# Patient Record
Sex: FEMALE | Race: WHITE | NOT HISPANIC OR LATINO | Employment: FULL TIME | URBAN - METROPOLITAN AREA
[De-identification: names, ages, dates, MRNs, and addresses within clinical notes are randomized per-mention and may not be internally consistent; named-entity substitution may affect disease eponyms.]

---

## 2018-01-22 ENCOUNTER — ALLSCRIPTS OFFICE VISIT (OUTPATIENT)
Dept: OTHER | Facility: OTHER | Age: 43
End: 2018-01-22

## 2018-01-22 DIAGNOSIS — E27.1 PRIMARY ADRENOCORTICAL INSUFFICIENCY (HCC): ICD-10-CM

## 2018-01-22 DIAGNOSIS — E03.9 HYPOTHYROIDISM: ICD-10-CM

## 2018-01-22 DIAGNOSIS — E55.9 VITAMIN D DEFICIENCY: ICD-10-CM

## 2018-01-23 NOTE — CONSULTS
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)   2  Vitamin D deficiency (268 9) (E55 9)   3  Hypothyroidism (244 9) (E03 9)    Plan   Gladstone's disease    · Solu-CORTEF 100 MG Injection Solution Reconstituted; Use IM in the event of    emergency   Rx By: Everardo Nielsen; Dispense: 0 Days ; #:1 X 1 Solution Reconstituted Vial; Refill: 0;For: Gladstone's disease; MARIANNA = N; Faxed To: [facility] #[id number]  · From  Hydrocortisone 10 MG Oral Tablet take 2 in the morning    take 1 in the afternoon To Hydrocortisone 10 MG Oral Tablet take 2 in the morning    take 1 in the afternoon    Take extra as directed in case of adrenal crisis   Rx By: Everardo Nielsen; Dispense: 0 Days ; #:120 Tablet; Refill: 0;For: Gladstone's disease; MARIANNA = N; Faxed To: [facility] #[id number] Jimi's disease, Hypothyroidism    · (1) PANCREATIC ISLET CELL ANTIBODIES; Status:Active; Requested for:22Jan2018; Perform:[facility]; ZZA:[id number];KNDNFYE; For:Gladstone's disease, Hypothyroidism; Ordered By:Tony Dominguez; Hypothyroidism    · Syringe 23G X 1 3 ML Miscellaneous; USE AS DIRECTED in case of adrenal crisis   Rx By: Everardo Nielsen; Dispense: 0 Days ; #:1 Miscellaneous; Refill: 0;For: Hypothyroidism; MARIANNA = N; Faxed To: [id number]  · (1) CBC/ PLT (NO DIFF); Status:Active; Requested for:22Jan2018; Perform:[facility]; FQM:[id number];XHQSLUE;KIJ:POOWJRTZCHFRYM; Ordered By:Tony Dominguez;   · (1) COMPREHENSIVE METABOLIC PANEL; Status:Active; Requested for:22Jan2018; Perform:[facility]; NJJ:[id number];SOEHLZK;GWW:BVXFZPCWPTPOWJ; Ordered By:Tony Dominguez;   · (1) HEMOGLOBIN A1C; Status:Active; Requested for:22Jan2018; Perform:[facility]; UJA:[id number];BRPJBOP;TALA:GBWJULHARVRJIA; Ordered By:Tony Dominguez;   · (1) T4, FREE; Status:Active; Requested for:22Jan2018;     Perform:[facility]; UOL:[id number];XJZRKGV;MJI:KXGLETOLUSTLGN; Ordered By:Tony Dominguez;   · (1) TSH; Status:Active; Requested for:22Jan2018; Perform:[facility]; RUF:[id number];MFYVSAK;VFO:MZHMATWBOJATPB; Ordered By:Anuja Dominguez; Hypothyroidism, Vitamin D deficiency    · (1) VITAMIN D 25-HYDROXY; Status:Active; Requested for:22Jan2018; Perform:[facility]; Due:22Jan2019;Ordered;For:Hypothyroidism, Vitamin D deficiency; Ordered By:Anuja Dominguez;  Vitamin D deficiency    · (1) CELIAC DISEASE AB PROFILE; Status:Active; Requested for:22Jan2018; Perform:[facility]; TVJ:[id number];CDZYGQN; For:Vitamin D deficiency; Ordered By:Anuja Dominguez;   · (1) METHYLMALONIC ACID,BLOOD; Status:Active; Requested for:22Jan2018; Perform:[facility]; FBR:[id number];SBVNIIE; For:Vitamin D deficiency; Ordered By:Miguel Angel Dominguez;   · (1) VITAMIN B12; Status:Active; Requested for:22Jan2018; Perform:[facility]; FTJ:[id number];LPBHHCU; For:Vitamin D deficiency; Ordered By:Miguel Angel Dominguez;   · (LC) LOLI-65 Autoantibody; Status:Active; Requested for:22Jan2018; Perform:[facility]; MVL:[id number];XPXJXVI; For:Vitamin D deficiency; Ordered By:Anuja Dominguez;   · Follow-up visit in 6 months Evaluation and Treatment  Follow-up  Status: Hold For -    Scheduling  Requested for: [id number]   Ordered; For: Vitamin D deficiency; Ordered By: Blaise Grewal Performed:  Due: [id number]    Discussion/Summary   Discussion Summary:    1  Jimi's disease: Continue hydrocortisone 20 mg in the morning and 10 mg in the afternoon  Discussed sick day rules such as doubling the dose for 2-3 days in setting of minor illness and mild fever and then return to baseline dose after that  Also discussed in terms of more moderate her severe illness such as high-grade fever above 100 4 or 101 to triple the dose for 3 days and return to baseline dose after that  Also discussed use of emergency Solu-Cortef injection kit in setting of adrenal crisis   Discussed that this does not replace going to the emergency room and if this is needed because she cannot take p o  in the setting of illness that she should use this and report to the emergency room  She continues to wear her emergency bracelet  Discussed if any surgical procedures are planned she should let me know  Continue fludrocortisone  Will check electrolyte panel  Hypothyroidism: Check TSH and free T4  Coffman syndrome: Will screen for other autoimmune diseases as ordered above  in 6 months  Counseling Documentation With Imm: The patient was counseled regarding diagnostic results,-- instructions for management,-- impressions  Medication SE Review and Pt Understands Tx: The treatment plan was reviewed with the patient/guardian  The patient/guardian understands and agrees with the treatment plan      Chief Complaint   Chief Complaint Free Text Note Form: consult      History of Present Illness   HPI: 74-year-old female with history of primary autoimmune hypothyroidism, Beauregard's disease, vitamin-D deficiency presents to [facility] care  He was diagnosed with hypothyroidism over 10 years ago  About 5 years ago, she was evaluated for hypotension, nausea, vomiting, weight loss and 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  Having both 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  Overall, she feels that she has been doing well lately  She did have a hospitalization in November of 2017 with a stomach bug and required intravenous hydrocortisone  Currently she takes hydrocortisone 20 mg in the morning and 10 mg in the afternoon  She takes fludrocortisone 0 1 mg daily  She has not had any other recent illnesses and does not have any surgeries planned other than dental crowns  She does have a emergency bracelet indicating she has adrenal insufficiency and is on chronic steroid replacement  She does report normal menstrual cycles   She denies any history of celiac, B12 deficiency, diabetes  She also has a history of hypothyroidism and takes Synthroid 125 mcg daily  She does not have a Hydrocortisone emergency injection kit  Review of Systems   Endo Adult ROS Female New Patient:      Constitutional/General: no change in ring size,-- no change in shoe size,-- no chills,-- no dizziness,-- no fainting,-- no fatigue,-- no fever,-- no forgetfulness,-- no headache,-- no loss of sleep,-- no recent weight loss,-- no nervousness,-- no numbness,-- no temperature intolerance,-- no excessive sweating-- and-- no weight gain  Muscle/Joint/Bone: no arm pain,-- no back pain,-- no hip pain,-- no leg pain,-- no foot pain,-- no neck pain,-- no hand pain,-- no shoulder pain,-- no arm weakness,-- no back weakness,-- no hip weakness,-- no leg weakness,-- no foot weakness,-- no neck weakness,-- no hand weakness,-- no shoulder weakness,-- no arm numbness,-- no back numbness,-- no hip numbness,-- no leg numbness,-- no foot numbness,-- no neck numbness,-- no hand numbness-- and-- no shoulder numbness  Gastrointestinal: no constipation,-- no diarrhea,-- no excessive hunger,-- no excessive thirst,-- no nausea,-- no poor appetite,-- no rectal bleeding,-- no stomach pain,-- no vomiting-- and-- no vomiting blood  Cardiovascular: no chest pain,-- no hypertension,-- no irregular heart beat,-- no hypotension,-- no poor circulation,-- no rapid heart beat-- and-- no ankle swelling  Eye/Ear/Nose/Throat: no bleeding gums,-- no blurred vision,-- no difficulty swallowing,-- no double vision,-- no gritty eyes,-- no hoarseness,-- no hearing loss,-- no persistent cough,-- no sinus problems,-- not seeing flashes-- and-- not seeing halos  Skin: no easy bruising,-- no hives,-- no itching,-- no change in a mole,-- no rashes,-- no scar-- and-- no slow healing sores  Genitourinary no blood in urine,-- no frequent urination,-- no night time urination-- and-- no painful urination        Genitourinary - Reproductive 3 Children, but-- normal period,-- no bleeding between periods,-- no breast lump,-- no hot flashes,-- no nipple discharge,-- no vaginal discharge-- and-- not pregnant  date of last menstruation 1/15/18 the interval of the LMP is unknown periods usually lasts an unspecified duration             ROS Reviewed:    ROS reviewed  Past Medical History   Active Problems And Past Medical History Reviewed: The active problems and past medical history were reviewed and updated today  Surgical History   Surgical History Reviewed: The surgical history was reviewed and updated today  Family History   Mother    1  Family history of cardiac disorder (V17 49) (Z82 49)  Father    2  Family history of cardiac disorder (V17 49) (Z82 49)  Family History Reviewed: The family history was reviewed and updated today  Social History    · Never a smoker  Social History Reviewed: The social history was reviewed and updated today  Current Meds    1  Fludrocortisone Acetate 0 1 MG Oral Tablet; 1 tablet daily; Therapy: (Cecy [place]in) to Recorded   2  Hydrocortisone 10 MG Oral Tablet; take 2 in the morning     take 1 in the afternoon; Therapy: (Cecy [place]) to Recorded   3  LORazepam 0 5 MG Oral Tablet; Therapy: (Cecy [place]) to Recorded   4  Synthroid 125 MCG Oral Tablet; Therapy: (Cecy [place]) to Recorded   5  Vitamin C TABS; Therapy: (Cecy Med[place]in) to Recorded   6  Vitamin D3 1000 UNIT Oral Capsule; Therapy: (Recorded:22Jan2018) to Recorded  Medication List Reviewed: The medication list was reviewed and updated today  Allergies   1   No Known Drug Allergies    Vitals   Vital Signs    Recorded: 75LVU2550 09:55AM   Heart Rate 72   Systolic 545   Diastolic 76   Height 5 ft 1 in   Weight 154 lb 8 oz   BMI Calculated 29 19   BSA Calculated 1 69     Physical Exam        Constitutional      General appearance: No acute distress, well appearing and well nourished  Eyes      Conjunctiva and lids: No swelling, erythema, or discharge  Pupils: Equal, round and reactive to light  The sclera are anicteric  Extraocular movements are intact  Ears, Nose, Mouth, and Throat      Neck: The neck is supple  The thyroid is normal in size with no palpable nodules  Pulmonary      Respiratory effort: No increased work of breathing or signs of respiratory distress  Auscultation of lungs: Clear to auscultation bilaterally with normal chest expansion  Cardiovascular      Auscultation of heart: Normal rate and rhythm with no murmurs, gallops or rubs  Examination of extremities for edema and/or varicosities: Normal        Abdomen      Abdomen: Abdomen is soft, non-tender with normal bowel sounds  Lymphatic      Palpation of lymph nodes: No supraclavicular or suboccipital lymphadenopathy  Skin      Skin and subcutaneous tissue: Normal skin temperature and color  Neurologic      Motor Strength: Strength is 5/5 bilaterally  Psychiatric      Orientation to person, place and time: Normal        Mood and affect: Affect and attention span are normal        Results/Data   Office Record Review: 11/22/2017: blood cells 16 1, hemoglobin 15 3, glucose 112, sodium 136, potassium 3 6    0 625, total T4 6 9        Signatures    Electronically signed by : RADHA Pemberton ; Jan 22 2018 10:45AM EST                       (Author)

## 2018-02-01 DIAGNOSIS — E27.1 ADDISON'S DISEASE (HCC): Primary | ICD-10-CM

## 2018-02-01 RX ORDER — HYDROCORTISONE 10 MG/1
TABLET ORAL
COMMUNITY
End: 2018-02-01 | Stop reason: SDUPTHER

## 2018-02-01 RX ORDER — BIOTIN 1 MG
TABLET ORAL
COMMUNITY

## 2018-02-01 RX ORDER — HYDROCORTISONE 10 MG/1
TABLET ORAL
Qty: 120 TABLET | Refills: 5 | Status: SHIPPED | OUTPATIENT
Start: 2018-02-01 | End: 2018-02-02 | Stop reason: SDUPTHER

## 2018-02-01 RX ORDER — RIBOFLAVIN (VITAMIN B2) 100 MG
TABLET ORAL
COMMUNITY

## 2018-02-01 RX ORDER — LORAZEPAM 0.5 MG/1
TABLET ORAL AS NEEDED
COMMUNITY

## 2018-02-01 RX ORDER — FLUDROCORTISONE ACETATE 0.1 MG/1
1 TABLET ORAL DAILY
COMMUNITY
End: 2018-04-09 | Stop reason: SDUPTHER

## 2018-02-01 RX ORDER — SYRINGE W-NEEDLE,DISPOSAB,3 ML 25GX5/8"
SYRINGE, EMPTY DISPOSABLE MISCELLANEOUS
COMMUNITY
Start: 2018-01-22

## 2018-02-01 RX ORDER — LEVOTHYROXINE SODIUM 0.12 MG/1
TABLET ORAL
COMMUNITY
End: 2018-04-09 | Stop reason: SDUPTHER

## 2018-02-01 NOTE — TELEPHONE ENCOUNTER
Spoke to patient, she needs the hydrocortisone to have specific dosing  The "adrenal crisis" extra does not work for the pharmacy  Please and thank you

## 2018-02-01 NOTE — TELEPHONE ENCOUNTER
Patient left a message in regards to one of the rx you prescribed  She stated in the message that her pharmacist need specific instructions because her insurance will not cover it the way it is written out for her   Please call patient at [phone]

## 2018-02-02 DIAGNOSIS — E27.1 ADDISON'S DISEASE (HCC): ICD-10-CM

## 2018-02-02 RX ORDER — HYDROCORTISONE 10 MG/1
TABLET ORAL
Qty: 120 TABLET | Refills: 0 | Status: SHIPPED | OUTPATIENT
Start: 2018-02-02 | End: 2018-02-02 | Stop reason: SDUPTHER

## 2018-02-02 RX ORDER — HYDROCORTISONE 10 MG/1
TABLET ORAL
Qty: 120 TABLET | Refills: 0 | Status: SHIPPED | OUTPATIENT
Start: 2018-02-02 | End: 2018-02-06 | Stop reason: SDUPTHER

## 2018-02-06 ENCOUNTER — TELEPHONE (OUTPATIENT)
Dept: ENDOCRINOLOGY | Facility: HOSPITAL | Age: 43
End: 2018-02-06

## 2018-02-06 DIAGNOSIS — E27.1 ADDISON'S DISEASE (HCC): ICD-10-CM

## 2018-02-06 RX ORDER — HYDROCORTISONE 10 MG/1
TABLET ORAL
Qty: 120 TABLET | Refills: 4 | Status: SHIPPED | OUTPATIENT
Start: 2018-02-06 | End: 2018-03-12 | Stop reason: SDUPTHER

## 2018-02-06 NOTE — TELEPHONE ENCOUNTER
Pt called in regards to her Hyrdocortisone 10mg that was sent on 2/2  She said she asked for enough refills to last her 6 months  She already picked up the script so if we send it to the pharmacy again they wont fill it for a month and I don't think that pharmacies hold the rx that long  She said who ever she spoke to last week told her the rx had refills on it  She does not want to have to call back next month and wants it fixed for when she is due again  Is there anything we can do?

## 2018-02-06 NOTE — TELEPHONE ENCOUNTER
I verbally called in this order yesterday, I did not authorize any refills per you order  I remember speaking to her previously and that order had refills  Can we send a new script with refills so she is set for the future? I can even call the pharmacy again just so I know it is processed  Previously you had sent it twice and they never received it  Thank you

## 2018-02-06 NOTE — TELEPHONE ENCOUNTER
Tried to send in updated script with refills to the pharmacy but it printed out  Can you fax it to the pharmacy? Not sure why it will not send over

## 2018-02-16 LAB
25(OH)D3+25(OH)D2 SERPL-MCNC: 34.5 NG/ML (ref 30–100)
ALBUMIN SERPL-MCNC: 4.7 G/DL (ref 3.5–5.5)
ALBUMIN/GLOB SERPL: 2 {RATIO} (ref 1.2–2.2)
ALP SERPL-CCNC: 38 IU/L (ref 39–117)
ALT SERPL-CCNC: 16 IU/L (ref 0–32)
AMBIG ABBREV DEFAULT: NORMAL
AST SERPL-CCNC: 18 IU/L (ref 0–40)
BILIRUB SERPL-MCNC: 0.5 MG/DL (ref 0–1.2)
BUN SERPL-MCNC: 16 MG/DL (ref 6–24)
BUN/CREAT SERPL: 19 (ref 9–23)
CALCIUM SERPL-MCNC: 9.6 MG/DL (ref 8.7–10.2)
CHLORIDE SERPL-SCNC: 96 MMOL/L (ref 96–106)
CO2 SERPL-SCNC: 23 MMOL/L (ref 18–29)
CREAT SERPL-MCNC: 0.86 MG/DL (ref 0.57–1)
ENDOMYSIUM IGA SER QL: NEGATIVE
ERYTHROCYTE [DISTWIDTH] IN BLOOD BY AUTOMATED COUNT: 13.4 % (ref 12.3–15.4)
GAD65 AB SER-ACNC: <5 U/ML (ref 0–5)
GLOBULIN SER-MCNC: 2.3 G/DL (ref 1.5–4.5)
GLUCOSE SERPL-MCNC: 81 MG/DL (ref 65–99)
HBA1C MFR BLD: 5 % (ref 4.8–5.6)
HCT VFR BLD AUTO: 44 % (ref 34–46.6)
HGB BLD-MCNC: 14.8 G/DL (ref 11.1–15.9)
IGA SERPL-MCNC: 277 MG/DL (ref 87–352)
MCH RBC QN AUTO: 32.1 PG (ref 26.6–33)
MCHC RBC AUTO-ENTMCNC: 33.6 G/DL (ref 31.5–35.7)
MCV RBC AUTO: 95 FL (ref 79–97)
METHYLMALONATE SERPL-SCNC: 113 NMOL/L (ref 0–378)
PANC ISLET CELL AB TITR SER: NEGATIVE {TITER}
PLATELET # BLD AUTO: 247 X10E3/UL (ref 150–379)
POTASSIUM SERPL-SCNC: 4 MMOL/L (ref 3.5–5.2)
PROT SERPL-MCNC: 7 G/DL (ref 6–8.5)
RBC # BLD AUTO: 4.61 X10E6/UL (ref 3.77–5.28)
SL AMB EGFR AFRICAN AMERICAN: 98 ML/MIN/1.73
SL AMB EGFR NON AFRICAN AMERICAN: 85 ML/MIN/1.73
SODIUM SERPL-SCNC: 135 MMOL/L (ref 134–144)
T4 FREE SERPL-MCNC: 1.84 NG/DL (ref 0.82–1.77)
TSH SERPL DL<=0.005 MIU/L-ACNC: 0.92 UIU/ML (ref 0.45–4.5)
TTG IGA SER-ACNC: <2 U/ML (ref 0–3)
VIT B12 SERPL-MCNC: 897 PG/ML (ref 232–1245)
WBC # BLD AUTO: 9.5 X10E3/UL (ref 3.4–10.8)

## 2018-03-12 ENCOUNTER — TELEPHONE (OUTPATIENT)
Dept: ENDOCRINOLOGY | Facility: HOSPITAL | Age: 43
End: 2018-03-12

## 2018-03-12 DIAGNOSIS — E27.1 ADDISON'S DISEASE (HCC): ICD-10-CM

## 2018-03-12 RX ORDER — HYDROCORTISONE 10 MG/1
TABLET ORAL
Qty: 120 TABLET | Refills: 4 | Status: SHIPPED | OUTPATIENT
Start: 2018-03-12 | End: 2018-07-24 | Stop reason: SDUPTHER

## 2018-03-12 NOTE — TELEPHONE ENCOUNTER
Pt needs a refill of hydrocoritsone tablet 10mg to [facility] in [place] [address]  She is completely out

## 2018-04-09 DIAGNOSIS — E06.3 HASHIMOTO'S THYROIDITIS: ICD-10-CM

## 2018-04-09 DIAGNOSIS — E27.1 ADRENAL INSUFFICIENCY (ADDISON'S DISEASE) (HCC): Primary | ICD-10-CM

## 2018-04-09 RX ORDER — LEVOTHYROXINE SODIUM 0.12 MG/1
125 TABLET ORAL DAILY
Qty: 30 TABLET | Refills: 3 | Status: SHIPPED | OUTPATIENT
Start: 2018-04-09 | End: 2018-07-24 | Stop reason: SDUPTHER

## 2018-04-09 RX ORDER — FLUDROCORTISONE ACETATE 0.1 MG/1
0.1 TABLET ORAL DAILY
Qty: 30 TABLET | Refills: 3 | Status: SHIPPED | OUTPATIENT
Start: 2018-04-09 | End: 2018-07-24 | Stop reason: SDUPTHER

## 2018-07-12 ENCOUNTER — TELEPHONE (OUTPATIENT)
Dept: ENDOCRINOLOGY | Facility: HOSPITAL | Age: 43
End: 2018-07-12

## 2018-07-12 DIAGNOSIS — E03.9 HYPOTHYROIDISM, UNSPECIFIED TYPE: Primary | ICD-10-CM

## 2018-07-12 NOTE — TELEPHONE ENCOUNTER
Pt called  Very concerned about too much meds  Has been having heart palpitations every day for over a week, can't function  Currently on Synthroid & two different steroids  Only difference may be that she changed her diet  Wants to get her levels checked ASAP  Please let her know

## 2018-07-12 NOTE — TELEPHONE ENCOUNTER
Pt has an appt with you on on 7/24  She had blood work done in February and those are in [place]  She wants to know if she needs any labs before her next appt?

## 2018-07-12 NOTE — TELEPHONE ENCOUNTER
Check TSH, free T4 and comprehensive metabolic panel    I will get in touch with her about the results prior to her office visit if need be to make changes in her medication regimen, if applicable

## 2018-07-14 LAB
ALBUMIN SERPL-MCNC: 4.5 G/DL (ref 3.5–5.5)
ALBUMIN/GLOB SERPL: 2 {RATIO} (ref 1.2–2.2)
ALP SERPL-CCNC: 32 IU/L (ref 39–117)
ALT SERPL-CCNC: 13 IU/L (ref 0–32)
AMBIG ABBREV DEFAULT: NORMAL
AST SERPL-CCNC: 14 IU/L (ref 0–40)
BILIRUB SERPL-MCNC: 0.7 MG/DL (ref 0–1.2)
BUN SERPL-MCNC: 12 MG/DL (ref 6–24)
BUN/CREAT SERPL: 16 (ref 9–23)
CALCIUM SERPL-MCNC: 9.5 MG/DL (ref 8.7–10.2)
CHLORIDE SERPL-SCNC: 98 MMOL/L (ref 96–106)
CO2 SERPL-SCNC: 26 MMOL/L (ref 20–29)
CREAT SERPL-MCNC: 0.76 MG/DL (ref 0.57–1)
GLOBULIN SER-MCNC: 2.2 G/DL (ref 1.5–4.5)
GLUCOSE SERPL-MCNC: 92 MG/DL (ref 65–99)
POTASSIUM SERPL-SCNC: 3.8 MMOL/L (ref 3.5–5.2)
PROT SERPL-MCNC: 6.7 G/DL (ref 6–8.5)
SL AMB EGFR AFRICAN AMERICAN: 113 ML/MIN/1.73
SL AMB EGFR NON AFRICAN AMERICAN: 98 ML/MIN/1.73
SODIUM SERPL-SCNC: 140 MMOL/L (ref 134–144)
T4 FREE SERPL-MCNC: 1.49 NG/DL (ref 0.82–1.77)
TSH SERPL DL<=0.005 MIU/L-ACNC: 2.28 UIU/ML (ref 0.45–4.5)

## 2018-07-24 ENCOUNTER — OFFICE VISIT (OUTPATIENT)
Dept: ENDOCRINOLOGY | Facility: HOSPITAL | Age: 43
End: 2018-07-24
Payer: COMMERCIAL

## 2018-07-24 VITALS
HEART RATE: 76 BPM | SYSTOLIC BLOOD PRESSURE: 112 MMHG | BODY MASS INDEX: 27.88 KG/M2 | HEIGHT: 60 IN | DIASTOLIC BLOOD PRESSURE: 74 MMHG | WEIGHT: 142 LBS

## 2018-07-24 DIAGNOSIS — E55.9 VITAMIN D DEFICIENCY: ICD-10-CM

## 2018-07-24 DIAGNOSIS — E06.3 HASHIMOTO'S THYROIDITIS: ICD-10-CM

## 2018-07-24 DIAGNOSIS — E06.3 HYPOTHYROIDISM DUE TO HASHIMOTO'S THYROIDITIS: ICD-10-CM

## 2018-07-24 DIAGNOSIS — E27.1 ADRENAL INSUFFICIENCY (ADDISON'S DISEASE) (HCC): ICD-10-CM

## 2018-07-24 DIAGNOSIS — E27.1 ADDISON'S DISEASE (HCC): Primary | ICD-10-CM

## 2018-07-24 DIAGNOSIS — E03.8 HYPOTHYROIDISM DUE TO HASHIMOTO'S THYROIDITIS: ICD-10-CM

## 2018-07-24 PROCEDURE — 99214 OFFICE O/P EST MOD 30 MIN: CPT | Performed by: NURSE PRACTITIONER

## 2018-07-24 RX ORDER — LEVOTHYROXINE SODIUM 0.12 MG/1
125 TABLET ORAL DAILY
Qty: 30 TABLET | Refills: 3 | Status: SHIPPED | OUTPATIENT
Start: 2018-07-24 | End: 2018-08-13 | Stop reason: SDUPTHER

## 2018-07-24 RX ORDER — HYDROCORTISONE 10 MG/1
TABLET ORAL
Qty: 120 TABLET | Refills: 6 | Status: SHIPPED | OUTPATIENT
Start: 2018-07-24 | End: 2019-03-04 | Stop reason: SDUPTHER

## 2018-07-24 RX ORDER — FLUDROCORTISONE ACETATE 0.1 MG/1
0.1 TABLET ORAL DAILY
Qty: 30 TABLET | Refills: 6 | Status: SHIPPED | OUTPATIENT
Start: 2018-07-24 | End: 2019-03-04 | Stop reason: SDUPTHER

## 2018-07-24 NOTE — PROGRESS NOTES
[facility] 38 y o  female MRN: [id number]    Encounter: [id number]      Assessment/Plan     Assessment: This is a 38y o -year-old female with [place]'s disease, hypothyroidism and vitamin-D deficiency    Plan:  1  [place]'s disease:  Continue Hydrocortisone at current doses morning and afternoon  Discussed how to utilize the emergency Solu-Cortef injection kit, if needed  Check comprehensive metabolic panel and renin with next lab draw  2   Hypothyroidism:  She complains of feeling anxious and fatigue  Her most recent TSH and free T4 are normal  Her most recent TSH and free T4 are normal  Recheck TSH and free T4 with next lab draw  Suggested she follow up with her primary care physician to further discuss her anxiety  3   Vitamin-D deficiency:  Continue with regular supplementation of 1000 units vitamin D3 daily  CC:  Follow-up of Jimi's disease, hypothyroidism and vitamin-D deficiency  History of Present Illness     HPI:  53-year-old female with history of primary autoimmune hypothyroidism, [place]'s disease, vitamin-D deficiency presents for follow-up  He was diagnosed with hypothyroidism over 10 years ago  About 5 years ago, she was evaluated for hypotension, nausea, vomiting, weight loss and discovered to have [place]'s disease  She did have a hospitalization in November of 2017 with a stomach bug and required intravenous hydrocortisone  Currently, she takes hydrocortisone 20 mg in the morning and 10 mg in the afternoon  She takes fludrocortisone 0 1 mg daily  She has not had any other recent illnesses and does not have any surgeries planned  She complains of anxiety and fatigue that seems to be worse on some days  She does have a emergency bracelet indicating she has adrenal insufficiency and is on chronic steroid replacement  She does report normal menstrual cycles  She denies any history of celiac, B12 deficiency, diabetes   She also has a history of hypothyroidism and takes Synthroid 125 mcg daily  Most recent TSH from July 13, 2018 was 2 280 with a free T4 of 1 49  She does not have a Hydrocortisone emergency injection kit  For her vitamin-D deficiency, she supplements with 1000 units of vitamin D3 daily  Review of Systems   Constitutional: Positive for fatigue  Negative for chills and fever  HENT: Negative  Eyes: Negative  Respiratory: Negative  Negative for chest tightness and shortness of breath  Cardiovascular: Positive for palpitations  Negative for chest pain  Gastrointestinal: Negative  Negative for abdominal pain, constipation, diarrhea and vomiting  Genitourinary: Negative  Musculoskeletal: Negative  Skin: Negative  Allergic/Immunologic: Negative  Neurological: Negative  Negative for dizziness, syncope, light-headedness and headaches  Hematological: Negative  Psychiatric/Behavioral: Negative  Anxiety   All other systems reviewed and are negative  Historical Information   No past medical history on file  No past surgical history on file  Social History   History   Alcohol use Not on file     History   Drug use: Unknown     History   Smoking Status    Never Smoker   Smokeless Tobacco    Never Used     Family History:   Family History   Problem Relation Age of Onset    Hypothyroidism Mother     Atrial fibrillation Father        Meds/Allergies   Current Outpatient Prescriptions   Medication Sig Dispense Refill    Ascorbic Acid (VITAMIN C) 100 MG tablet Take by mouth      Cholecalciferol (VITAMIN D3) 1000 units CAPS Take by mouth      fludrocortisone (FLORINEF) 0 1 mg tablet Take 1 tablet (0 1 mg total) by mouth daily 30 tablet 3    hydrocortisone (CORTEF) 10 mg tablet 2 tabs in the Am, 1 tab q1PM  Take extra as directed in acute illness   120 tablet 4    hydrocortisone sodium succinate, PF, (SOLU-CORTEF) 100 mg SOLR Inject as directed      levothyroxine (SYNTHROID) 125 mcg tablet Take 1 tablet (125 mcg total) by mouth daily 30 tablet 3    LORazepam (ATIVAN) 0 5 mg tablet Take by mouth      Syringe/Needle, Disp, (SYRINGE 3CC/23GX1") 23G X 1" 3 ML MISC by Does not apply route       No current facility-administered medications for this visit  No Known Allergies    Objective   Vitals: Blood pressure 112/74, pulse 76, height 5' (1 524 m), weight 64 4 kg (142 lb)  Physical Exam   Constitutional: She is oriented to person, place, and time  She appears well-developed and well-nourished  No distress  HENT:   Head: Normocephalic and atraumatic  Mouth/Throat: Oropharynx is clear and moist    Eyes: Conjunctivae and EOM are normal  Pupils are equal, round, and reactive to light  Right eye exhibits no discharge  Left eye exhibits no discharge  Neck: Normal range of motion  Neck supple  No JVD present  No tracheal deviation present  No thyromegaly present  Cardiovascular: Normal rate, regular rhythm, normal heart sounds and intact distal pulses  Pulmonary/Chest: Effort normal and breath sounds normal  No stridor  No respiratory distress  She has no wheezes  She has no rales  She exhibits no tenderness  Abdominal: Soft  Bowel sounds are normal  She exhibits no distension  There is no tenderness  Musculoskeletal: Normal range of motion  She exhibits no edema, tenderness or deformity  Lymphadenopathy:     She has no cervical adenopathy  Neurological: She is alert and oriented to person, place, and time  She displays normal reflexes  No cranial nerve deficit  Coordination normal    Skin: Skin is warm and dry  No rash noted  No erythema  No pallor  Psychiatric: She has a normal mood and affect  Her behavior is normal  Judgment and thought content normal    Vitals reviewed  Lab Results:   Lab Results   Component Value Date/Time    Free t4 1 49 07/13/2018 10:52 AM    Free t4 1 84 (H) 02/13/2018 10:30 AM       Portions of the record may have been created with voice recognition software   Occasional wrong word or "sound a like" substitutions may have occurred due to the inherent limitations of voice recognition software  Read the chart carefully and recognize, using context, where substitutions have occurred

## 2018-07-24 NOTE — PATIENT INSTRUCTIONS
Continue Levothyroxine and Hydrocortisone with Florinef at current doses  Check lab work in October 2018 to reassess  Contact the office with any problems

## 2018-08-13 DIAGNOSIS — E06.3 HASHIMOTO'S THYROIDITIS: ICD-10-CM

## 2018-08-14 RX ORDER — LEVOTHYROXINE SODIUM 125 UG/1
125 TABLET ORAL DAILY
Qty: 30 TABLET | Refills: 4 | Status: SHIPPED | OUTPATIENT
Start: 2018-08-14 | End: 2019-03-26 | Stop reason: SDUPTHER

## 2018-12-09 DIAGNOSIS — E06.3 HASHIMOTO'S THYROIDITIS: ICD-10-CM

## 2018-12-10 RX ORDER — LEVOTHYROXINE SODIUM 125 UG/1
TABLET ORAL
Qty: 30 TABLET | Refills: 3 | Status: SHIPPED | OUTPATIENT
Start: 2018-12-10 | End: 2020-01-21 | Stop reason: SDUPTHER

## 2019-01-22 DIAGNOSIS — E27.1 ADDISON'S DISEASE (HCC): Primary | ICD-10-CM

## 2019-01-22 NOTE — TELEPHONE ENCOUNTER
Patient requests refill of solu-cortef  Can you just check to make sure I ordered this correctly   Thank you

## 2019-01-31 ENCOUNTER — TELEPHONE (OUTPATIENT)
Dept: ENDOCRINOLOGY | Facility: HOSPITAL | Age: 44
End: 2019-01-31

## 2019-01-31 NOTE — TELEPHONE ENCOUNTER
[facility] called the patient and told her we need to pre-auth the Solu-cortef  She has new insurance as of 11-28-18 (not on Epic yet)  It is with [employer], ID # [id number]  Her insurance company told her to have us date it for the same day the Rx was originally sent in

## 2019-02-01 NOTE — TELEPHONE ENCOUNTER
Left message for patient to see what number we can call that would be on the back of her card  After I left a message I looked up the information  I will reach out to them

## 2019-03-04 ENCOUNTER — TELEPHONE (OUTPATIENT)
Dept: ENDOCRINOLOGY | Facility: HOSPITAL | Age: 44
End: 2019-03-04

## 2019-03-04 DIAGNOSIS — E27.1 ADDISON'S DISEASE (HCC): ICD-10-CM

## 2019-03-04 DIAGNOSIS — E27.1 ADRENAL INSUFFICIENCY (ADDISON'S DISEASE) (HCC): ICD-10-CM

## 2019-03-04 RX ORDER — FLUDROCORTISONE ACETATE 0.1 MG/1
0.1 TABLET ORAL DAILY
Qty: 30 TABLET | Refills: 6 | Status: SHIPPED | OUTPATIENT
Start: 2019-03-04 | End: 2019-03-26 | Stop reason: SDUPTHER

## 2019-03-04 RX ORDER — HYDROCORTISONE 10 MG/1
TABLET ORAL
Qty: 120 TABLET | Refills: 3 | Status: SHIPPED | OUTPATIENT
Start: 2019-03-04 | End: 2019-03-26 | Stop reason: SDUPTHER

## 2019-03-04 NOTE — TELEPHONE ENCOUNTER
Patient had to r/s 3/7 appt to 3/26/19  She needs renewal of hydrocortisone 10mg/ 2 tabs in AM/ 1 tab in PM/ take extra for acute illness/ 30 day supply/ [facility] #[id number]       Also needs renewal of fludrocortisone/  1mg/ 1 tab daily/ 30 day supply/ send to [facility] #[id number]

## 2019-03-16 LAB
ALBUMIN SERPL-MCNC: 4.6 G/DL (ref 3.5–5.5)
ALBUMIN/GLOB SERPL: 2.1 {RATIO} (ref 1.2–2.2)
ALP SERPL-CCNC: 36 IU/L (ref 39–117)
ALT SERPL-CCNC: 23 IU/L (ref 0–32)
AST SERPL-CCNC: 18 IU/L (ref 0–40)
BILIRUB SERPL-MCNC: 0.7 MG/DL (ref 0–1.2)
BUN SERPL-MCNC: 12 MG/DL (ref 6–24)
BUN/CREAT SERPL: 15 (ref 9–23)
CALCIUM SERPL-MCNC: 9.9 MG/DL (ref 8.7–10.2)
CHLORIDE SERPL-SCNC: 95 MMOL/L (ref 96–106)
CO2 SERPL-SCNC: 25 MMOL/L (ref 20–29)
CREAT SERPL-MCNC: 0.8 MG/DL (ref 0.57–1)
GLOBULIN SER-MCNC: 2.2 G/DL (ref 1.5–4.5)
GLUCOSE SERPL-MCNC: 95 MG/DL (ref 65–99)
POTASSIUM SERPL-SCNC: 3.8 MMOL/L (ref 3.5–5.2)
PROT SERPL-MCNC: 6.8 G/DL (ref 6–8.5)
SL AMB EGFR AFRICAN AMERICAN: 106 ML/MIN/1.73
SL AMB EGFR NON AFRICAN AMERICAN: 92 ML/MIN/1.73
SODIUM SERPL-SCNC: 136 MMOL/L (ref 134–144)
T4 FREE SERPL-MCNC: 1.55 NG/DL (ref 0.82–1.77)
TSH SERPL DL<=0.005 MIU/L-ACNC: 0.8 UIU/ML (ref 0.45–4.5)

## 2019-03-19 ENCOUNTER — TELEPHONE (OUTPATIENT)
Dept: ENDOCRINOLOGY | Facility: HOSPITAL | Age: 44
End: 2019-03-19

## 2019-03-19 LAB — RENIN PLAS-CCNC: 2.65 NG/ML/HR (ref 0.17–5.38)

## 2019-03-19 NOTE — TELEPHONE ENCOUNTER
Patient would like her lab results, she has been having some swelling in her one leg and is concerned that her levels may be off   She does have an appointment with Miguel Duvall on 3/26

## 2019-03-26 ENCOUNTER — OFFICE VISIT (OUTPATIENT)
Dept: ENDOCRINOLOGY | Facility: HOSPITAL | Age: 44
End: 2019-03-26
Payer: COMMERCIAL

## 2019-03-26 VITALS
HEIGHT: 60 IN | HEART RATE: 84 BPM | BODY MASS INDEX: 30.08 KG/M2 | SYSTOLIC BLOOD PRESSURE: 108 MMHG | WEIGHT: 153.2 LBS | DIASTOLIC BLOOD PRESSURE: 72 MMHG

## 2019-03-26 DIAGNOSIS — E27.1 PRIMARY ADRENOCORTICAL INSUFFICIENCY (HCC): ICD-10-CM

## 2019-03-26 DIAGNOSIS — E55.9 VITAMIN D DEFICIENCY: ICD-10-CM

## 2019-03-26 DIAGNOSIS — E27.1 ADDISON'S DISEASE (HCC): ICD-10-CM

## 2019-03-26 DIAGNOSIS — E06.3 HYPOTHYROIDISM DUE TO HASHIMOTO'S THYROIDITIS: ICD-10-CM

## 2019-03-26 DIAGNOSIS — E03.8 HYPOTHYROIDISM DUE TO HASHIMOTO'S THYROIDITIS: ICD-10-CM

## 2019-03-26 DIAGNOSIS — E27.1 ADRENAL INSUFFICIENCY (ADDISON'S DISEASE) (HCC): Primary | ICD-10-CM

## 2019-03-26 DIAGNOSIS — E06.3 HASHIMOTO'S THYROIDITIS: ICD-10-CM

## 2019-03-26 PROCEDURE — 99214 OFFICE O/P EST MOD 30 MIN: CPT | Performed by: NURSE PRACTITIONER

## 2019-03-26 RX ORDER — LEVOTHYROXINE SODIUM 125 UG/1
125 TABLET ORAL DAILY
Qty: 30 TABLET | Refills: 11 | Status: SHIPPED | OUTPATIENT
Start: 2019-03-26

## 2019-03-26 RX ORDER — FLUDROCORTISONE ACETATE 0.1 MG/1
0.1 TABLET ORAL DAILY
Qty: 30 TABLET | Refills: 6 | Status: SHIPPED | OUTPATIENT
Start: 2019-03-26 | End: 2019-11-12 | Stop reason: SDUPTHER

## 2019-03-26 RX ORDER — HYDROCORTISONE 10 MG/1
TABLET ORAL
Qty: 120 TABLET | Refills: 6 | Status: SHIPPED | OUTPATIENT
Start: 2019-03-26 | End: 2019-11-12 | Stop reason: SDUPTHER

## 2019-03-26 NOTE — PATIENT INSTRUCTIONS
Continue Levothyroxine and Hydrocortisone with Florinef at current doses      Obtain lab work as prescribed      Contact the office with any problems  Try to take your Vitamin D consistently

## 2019-03-26 NOTE — PROGRESS NOTES
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, hypothyroidism and vitamin-D deficiency  Plan:  1  Jimi's disease:  Continue Hydrocortisone at current doses morning and afternoon  Discussed how to utilize the emergency Solu-Cortef injection kit, if needed  Check comprehensive metabolic panel and renin prior to next office visit      2  Hypothyroidism:  Generally, she feels well  Her most recent TSH and free T4 are normal  Her most recent TSH and free T4 are normal  Recheck TSH and free T4 prior to next office visit      3   Vitamin-D deficiency:  Continue with regular supplementation of 1000 units vitamin D3 daily  Check vitamin-D 25 hydroxy level with next lab draw  CC: Follow-up of Rock's disease, hypothyroidism and vitamin-D deficiency  History of Present Illness     HPI:  46-year-old female with history of primary autoimmune hypothyroidism, Jimi's disease, vitamin-D deficiency presents for follow-up  He was diagnosed with hypothyroidism over 10 years ago  About 5 years ago, she was evaluated for hypotension, nausea, vomiting, weight loss and 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  She did have a hospitalization in November of 2017 with a stomach bug and required intravenous hydrocortisone  Currently, she treats with hydrocortisone 20 mg in the morning and 10 mg in the afternoon  She also treats with fludrocortisone 0 1 mg daily  She has not had any other recent illnesses and does not have any surgeries planned  She complains of some fatigue at times but attributes this to being a busy mother  She does experience some transient anxiety  She does have a emergency bracelet indicating she has adrenal insufficiency and is on chronic steroid replacement  She does report normal menstrual cycles  She denies any history of celiac, B12 deficiency, diabetes   She also has a history of hypothyroidism and takes Synthroid 125 mcg daily  Most recent TSH from March 15, 2019 was 0 800 with a free T4 of 1 55  She does not have a Hydrocortisone emergency injection kit       For her vitamin-D deficiency, she supplements with 1000 units of vitamin D3 daily      Review of Systems   Constitutional: Positive for fatigue  Negative for chills and fever  HENT: Negative  Negative for trouble swallowing and voice change  Eyes: Negative  Respiratory: Negative  Negative for chest tightness and shortness of breath  Cardiovascular: Positive for palpitations  Negative for chest pain  Gastrointestinal: Negative  Negative for abdominal pain, constipation, diarrhea and vomiting  Endocrine: Negative for cold intolerance, heat intolerance, polydipsia, polyphagia and polyuria  Genitourinary: Negative  Musculoskeletal: Negative  Skin: Negative  Allergic/Immunologic: Negative  Neurological: Negative  Negative for dizziness, syncope, light-headedness and headaches  Hematological: Negative  Psychiatric/Behavioral: The patient is nervous/anxious (At times)  All other systems reviewed and are negative  Historical Information   No past medical history on file  No past surgical history on file    Social History   Social History     Substance and Sexual Activity   Alcohol Use Not on file     Social History     Substance and Sexual Activity   Drug Use Not on file     Social History     Tobacco Use   Smoking Status Never Smoker   Smokeless Tobacco Never Used     Family History:   Family History   Problem Relation Age of Onset    Hypothyroidism Mother     Atrial fibrillation Father        Meds/Allergies   Current Outpatient Medications   Medication Sig Dispense Refill    Ascorbic Acid (VITAMIN C) 100 MG tablet Take by mouth      Cholecalciferol (VITAMIN D3) 1000 units CAPS Take by mouth      fludrocortisone (FLORINEF) 0 1 mg tablet Take 1 tablet (0 1 mg total) by mouth daily 30 tablet 6    hydrocortisone (CORTEF) 10 mg tablet 2 tabs in the Am, 1 tab q1PM  Take extra as directed in acute illness  120 tablet 3    hydrocortisone sodium succinate, PF, (SOLU-CORTEF) 100 mg SOLR Inject 2 mL (100 mg total) into a muscle as needed (In case of adrenal crisis) 2 mL 0    LORazepam (ATIVAN) 0 5 mg tablet Take by mouth      SYNTHROID 125 MCG tablet Take 1 tablet (125 mcg total) by mouth daily 30 tablet 4    SYNTHROID 125 MCG tablet TAKE 1 TABLET BY MOUTH EVERY DAY 30 tablet 3    Syringe/Needle, Disp, (SYRINGE 3CC/23GX1") 23G X 1" 3 ML MISC by Does not apply route       No current facility-administered medications for this visit  No Known Allergies    Objective   Vitals: Blood pressure 108/72, pulse 84, height 5' (1 524 m), weight 69 5 kg (153 lb 3 2 oz)  Physical Exam   Constitutional: She is oriented to person, place, and time  She appears well-developed and well-nourished  HENT:   Head: Normocephalic and atraumatic  Mouth/Throat: Oropharynx is clear and moist    Eyes: Pupils are equal, round, and reactive to light  Conjunctivae and EOM are normal    Neck: Normal range of motion  Neck supple  Cardiovascular: Normal rate, regular rhythm, normal heart sounds and intact distal pulses  Pulmonary/Chest: Effort normal and breath sounds normal    Abdominal: Soft  Bowel sounds are normal    Musculoskeletal: Normal range of motion  Neurological: She is alert and oriented to person, place, and time  Skin: Skin is warm and dry  Psychiatric: She has a normal mood and affect  Her behavior is normal  Judgment and thought content normal    Vitals reviewed  Lab Results:   Lab Results   Component Value Date/Time    Free t4 1 55 03/15/2019 08:19 AM    Free t4 1 49 07/13/2018 10:52 AM     Portions of the record may have been created with voice recognition software   Occasional wrong word or "sound a like" substitutions may have occurred due to the inherent limitations of voice recognition software  Read the chart carefully and recognize, using context, where substitutions have occurred

## 2019-08-08 ENCOUNTER — TELEPHONE (OUTPATIENT)
Dept: ENDOCRINOLOGY | Facility: HOSPITAL | Age: 44
End: 2019-08-08

## 2019-08-08 NOTE — TELEPHONE ENCOUNTER
Patient has Sauk's disease  She has infection in the roof of her mouth due to tooth issues  She has had the tooth decay fixed but now has to see oral surgeon on 8/16 to have a "mini" oral surgery to clean out infection and have a bone graft  She is on an antibiotic now   She is questioning if she should increase her dosage of hydrocortisone before/ after  the oral surgery on 8/16

## 2019-08-08 NOTE — TELEPHONE ENCOUNTER
For that procedure, she can just double her dose for the day of the procedure and then resume her normal dose today after

## 2019-11-12 DIAGNOSIS — E27.1 ADDISON'S DISEASE (HCC): ICD-10-CM

## 2019-11-12 DIAGNOSIS — E27.1 ADRENAL INSUFFICIENCY (ADDISON'S DISEASE) (HCC): ICD-10-CM

## 2019-11-12 RX ORDER — FLUDROCORTISONE ACETATE 0.1 MG/1
TABLET ORAL
Qty: 30 TABLET | Refills: 6 | Status: SHIPPED | OUTPATIENT
Start: 2019-11-12 | End: 2020-01-21 | Stop reason: SDUPTHER

## 2019-11-12 RX ORDER — HYDROCORTISONE 10 MG/1
TABLET ORAL
Qty: 120 TABLET | Refills: 6 | Status: SHIPPED | OUTPATIENT
Start: 2019-11-12 | End: 2020-01-21 | Stop reason: SDUPTHER

## 2020-01-18 LAB
25(OH)D3+25(OH)D2 SERPL-MCNC: 23.8 NG/ML (ref 30–100)
ALBUMIN SERPL-MCNC: 4.5 G/DL (ref 3.5–5.5)
ALBUMIN/GLOB SERPL: 2.5 {RATIO} (ref 1.2–2.2)
ALP SERPL-CCNC: 36 IU/L (ref 39–117)
ALT SERPL-CCNC: 14 IU/L (ref 0–32)
AST SERPL-CCNC: 17 IU/L (ref 0–40)
BILIRUB SERPL-MCNC: 0.5 MG/DL (ref 0–1.2)
BUN SERPL-MCNC: 10 MG/DL (ref 6–24)
BUN/CREAT SERPL: 14 (ref 9–23)
CALCIUM SERPL-MCNC: 9.2 MG/DL (ref 8.7–10.2)
CHLORIDE SERPL-SCNC: 101 MMOL/L (ref 96–106)
CO2 SERPL-SCNC: 23 MMOL/L (ref 20–29)
CREAT SERPL-MCNC: 0.7 MG/DL (ref 0.57–1)
GLOBULIN SER-MCNC: 1.8 G/DL (ref 1.5–4.5)
GLUCOSE SERPL-MCNC: 100 MG/DL (ref 65–99)
POTASSIUM SERPL-SCNC: 3.3 MMOL/L (ref 3.5–5.2)
PROT SERPL-MCNC: 6.3 G/DL (ref 6–8.5)
RENIN PLAS-CCNC: 3.25 NG/ML/HR (ref 0.17–5.38)
SL AMB EGFR AFRICAN AMERICAN: 124 ML/MIN/1.73
SL AMB EGFR NON AFRICAN AMERICAN: 107 ML/MIN/1.73
SODIUM SERPL-SCNC: 139 MMOL/L (ref 134–144)
T4 FREE SERPL-MCNC: 1.79 NG/DL (ref 0.82–1.77)
TSH SERPL DL<=0.005 MIU/L-ACNC: 0.44 UIU/ML (ref 0.45–4.5)

## 2020-01-21 ENCOUNTER — OFFICE VISIT (OUTPATIENT)
Dept: ENDOCRINOLOGY | Facility: HOSPITAL | Age: 45
End: 2020-01-21
Payer: COMMERCIAL

## 2020-01-21 VITALS
DIASTOLIC BLOOD PRESSURE: 78 MMHG | SYSTOLIC BLOOD PRESSURE: 132 MMHG | HEART RATE: 104 BPM | WEIGHT: 162.4 LBS | BODY MASS INDEX: 31.88 KG/M2 | HEIGHT: 60 IN

## 2020-01-21 DIAGNOSIS — E03.8 HYPOTHYROIDISM DUE TO HASHIMOTO'S THYROIDITIS: ICD-10-CM

## 2020-01-21 DIAGNOSIS — E55.9 VITAMIN D DEFICIENCY: ICD-10-CM

## 2020-01-21 DIAGNOSIS — E27.1 ADDISON'S DISEASE (HCC): ICD-10-CM

## 2020-01-21 DIAGNOSIS — E06.3 HYPOTHYROIDISM DUE TO HASHIMOTO'S THYROIDITIS: ICD-10-CM

## 2020-01-21 DIAGNOSIS — E06.3 HASHIMOTO'S THYROIDITIS: ICD-10-CM

## 2020-01-21 DIAGNOSIS — E27.1 ADRENAL INSUFFICIENCY (ADDISON'S DISEASE) (HCC): Primary | ICD-10-CM

## 2020-01-21 PROCEDURE — 99214 OFFICE O/P EST MOD 30 MIN: CPT | Performed by: NURSE PRACTITIONER

## 2020-01-21 RX ORDER — HYDROCORTISONE 10 MG/1
TABLET ORAL
Qty: 120 TABLET | Refills: 7 | Status: SHIPPED | OUTPATIENT
Start: 2020-01-21 | End: 2020-03-06 | Stop reason: SDUPTHER

## 2020-01-21 RX ORDER — FLUDROCORTISONE ACETATE 0.1 MG/1
0.1 TABLET ORAL DAILY
Qty: 30 TABLET | Refills: 7 | Status: SHIPPED | OUTPATIENT
Start: 2020-01-21 | End: 2020-03-06 | Stop reason: SDUPTHER

## 2020-01-21 RX ORDER — LEVOTHYROXINE SODIUM 125 UG/1
125 TABLET ORAL DAILY
Qty: 30 TABLET | Refills: 7 | Status: SHIPPED | OUTPATIENT
Start: 2020-01-21 | End: 2020-07-21 | Stop reason: SDUPTHER

## 2020-01-21 NOTE — PATIENT INSTRUCTIONS
Your TSH is low with an elevated free T4  Continue Synthroid 125 mcg Monday through Saturday and omit your dose on Sunday  Recheck TSH and free T4 in 6-8 weeks to reassess  We will contact you with results and any applicable changes to your Synthroid regimen  Continue Hydrocortisone with Florinef at current doses      Obtain lab work as prescribed      Contact the office with any problems      Take your Vitamin D consistently

## 2020-01-21 NOTE — PROGRESS NOTES
[address] 45 y o  female MRN: [id number]    Encounter: [id number]      Assessment/Plan     Assessment: This is a 45y o -year-old female with Jimi's disease, hypothyroidism and vitamin-D deficiency  Plan:  1   Jimi's disease:  Continue Hydrocortisone at current doses morning and afternoon  Check comprehensive metabolic panel and renin prior to next office visit      2   Hypothyroidism:  Complains of worsening anxiety lately  Her most recent TSH is low with a elevated free T4  For this, I have asked her to continue Synthroid 125 mcg Monday through Saturday and omit her Sunday dose  Recheck TSH and free T4 in 6-8 weeks to reassess  We will contact her with the results and any applicable further changes to her Synthroid regimen        3   Vitamin-D deficiency:  Her most recent 25 hydroxy vitamin-D level is slightly low  She has not been supplementing regularly with vitamin-D  Resume regular supplementation of 1000 units vitamin D3 daily  CC:  Beatrice's disease, hypothyroidism and vitamin-D deficiency follow-up    History of Present Illness     HPI:  57-year-old female with history of primary autoimmune hypothyroidism, Beatrice's disease, vitamin-D deficiency presents for follow-up  He was diagnosed with hypothyroidism over 10 years ago  Approximately 6 years ago, she was evaluated for hypotension, nausea, vomiting, weight loss and 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  She did have a hospitalization in November of 2017 with a stomach bug and required intravenous hydrocortisone  Currently, she treats with hydrocortisone 20 mg in the morning and 10 mg in the afternoon  She also treats with fludrocortisone 0 1 mg daily  She has not had any other recent illnesses and does not have any surgeries planned   She complains of some fatigue at times    She does experience some transient anxiety which appears to be more frequently lately   [place] Smoker a emergency bracelet indicating she has adrenal insufficiency and is on chronic steroid replacement  She does report normal menstrual cycles  She denies any history of celiac, B12 deficiency, diabetes  She also has a history of hypothyroidism and takes Synthroid 125 mcg daily   Most recent TSH from  January 13, 2020  was 0 442  with a free T4 of 1 73       For her vitamin-D deficiency, she supplements with 1000 units of vitamin D3 daily  Her most recent vitamin-D 25 hydroxy level from January 13, 2020 is 21  8        Review of Systems   Constitutional: Positive for fatigue  Negative for chills and fever  HENT: Negative  Negative for trouble swallowing and voice change  Eyes: Negative  Negative for visual disturbance  Respiratory: Negative  Negative for chest tightness and shortness of breath  Cardiovascular: Negative  Negative for chest pain and palpitations  Gastrointestinal: Negative  Negative for abdominal pain, constipation, diarrhea and vomiting  Endocrine: Negative for cold intolerance, heat intolerance, polydipsia, polyphagia and polyuria  Genitourinary: Negative  Musculoskeletal: Negative  Skin: Negative  Allergic/Immunologic: Negative  Neurological: Negative  Negative for dizziness, syncope, light-headedness and headaches  Hematological: Negative  Psychiatric/Behavioral: The patient is nervous/anxious (At times)  All other systems reviewed and are negative  Historical Information   No past medical history on file  No past surgical history on file    Social History   Social History     Substance and Sexual Activity   Alcohol Use Not on file     Social History     Substance and Sexual Activity   Drug Use Not on file     Social History     Tobacco Use   Smoking Status Never Smoker   Smokeless Tobacco Never Used     Family History:   Family History   Problem Relation Age of Onset    Hypothyroidism Mother     Atrial fibrillation Father        Meds/Allergies   Current Outpatient Medications   Medication Sig Dispense Refill    Ascorbic Acid (VITAMIN C) 100 MG tablet Take by mouth      fludrocortisone (FLORINEF) 0 1 mg tablet TAKE 1 TABLET BY MOUTH EVERY DAY 30 tablet 6    hydrocortisone (CORTEF) 10 mg tablet PLEASE SEE ATTACHED FOR DETAILED DIRECTIONS 120 tablet 6    hydrocortisone sodium succinate, PF, (SOLU-CORTEF) 100 mg SOLR Inject 2 mL (100 mg total) into a muscle as needed (In case of adrenal crisis) 2 mL 0    LORazepam (ATIVAN) 0 5 mg tablet Take by mouth      SYNTHROID 125 MCG tablet TAKE 1 TABLET BY MOUTH EVERY DAY 30 tablet 3    Syringe/Needle, Disp, (SYRINGE 3CC/23GX1") 23G X 1" 3 ML MISC by Does not apply route      Cholecalciferol (VITAMIN D3) 1000 units CAPS Take by mouth      SYNTHROID 125 MCG tablet Take 1 tablet (125 mcg total) by mouth daily (Patient not taking: Reported on 1/21/2020) 30 tablet 11     No current facility-administered medications for this visit  No Known Allergies    Objective   Vitals: Height 5' (1 524 m), weight 73 7 kg (162 lb 6 4 oz)  Physical Exam   Constitutional: She is oriented to person, place, and time  She appears well-developed and well-nourished  HENT:   Head: Normocephalic and atraumatic  Mouth/Throat: Oropharynx is clear and moist    Eyes: Pupils are equal, round, and reactive to light  Conjunctivae and EOM are normal    Neck: Normal range of motion  Neck supple  Cardiovascular: Normal rate, regular rhythm, normal heart sounds and intact distal pulses  Pulmonary/Chest: Effort normal and breath sounds normal    Abdominal: Soft  Bowel sounds are normal    Musculoskeletal: Normal range of motion  Neurological: She is alert and oriented to person, place, and time  Skin: Skin is warm and dry  Psychiatric: She has a normal mood and affect  Her behavior is normal  Judgment and thought content normal    Vitals reviewed      Lab Results:   Lab Results   Component Value Date/Time    Free t4 1 79 (H) 01/13/2020 07:54 AM    Free t4 1 55 03/15/2019 08:19 AM     Portions of the record may have been created with voice recognition software  Occasional wrong word or "sound a like" substitutions may have occurred due to the inherent limitations of voice recognition software  Read the chart carefully and recognize, using context, where substitutions have occurred

## 2020-03-06 ENCOUNTER — TELEPHONE (OUTPATIENT)
Dept: ENDOCRINOLOGY | Facility: HOSPITAL | Age: 45
End: 2020-03-06

## 2020-03-06 DIAGNOSIS — E27.1 ADRENAL INSUFFICIENCY (ADDISON'S DISEASE) (HCC): ICD-10-CM

## 2020-03-06 DIAGNOSIS — E27.1 ADDISON'S DISEASE (HCC): ICD-10-CM

## 2020-03-06 RX ORDER — FLUDROCORTISONE ACETATE 0.1 MG/1
0.1 TABLET ORAL DAILY
Qty: 90 TABLET | Refills: 3 | Status: SHIPPED | OUTPATIENT
Start: 2020-03-06 | End: 2021-06-16 | Stop reason: SDUPTHER

## 2020-03-06 RX ORDER — HYDROCORTISONE 10 MG/1
TABLET ORAL
Qty: 270 TABLET | Refills: 3 | Status: SHIPPED | OUTPATIENT
Start: 2020-03-06 | End: 2021-03-02

## 2020-03-06 NOTE — TELEPHONE ENCOUNTER
Pt needs a 90 day refill of her Hydrocortisone and her Fludrocortisone  She just saw you in January and has a follow up in July  With the Hydrocortisone your last instructions say SEE ATTACHED FOR DETAILED DIRECTIONS  Please specify so that can be filled

## 2020-03-13 DIAGNOSIS — E27.1 ADDISON'S DISEASE (HCC): Primary | ICD-10-CM

## 2020-03-13 NOTE — TELEPHONE ENCOUNTER
Pt's Emergency Solu-Cortef has  so she needs a new script sent to the pharmacy please  Dena Rivers is out of the office and I don't know if and when Dr Andres Singh will get on the computer to send this

## 2020-03-17 ENCOUNTER — TELEPHONE (OUTPATIENT)
Dept: ENDOCRINOLOGY | Facility: HOSPITAL | Age: 45
End: 2020-03-17

## 2020-03-17 NOTE — TELEPHONE ENCOUNTER
They generic Hydrocortisone is not available  They are working on getting the [employer] but it's supposed to be in tomorrow but they can't guarantee  Is there any other option for her?

## 2020-03-17 NOTE — TELEPHONE ENCOUNTER
Pt wants to wait and see what happens with the pharmacy tomorrow and if they don't get it in, she will call

## 2020-07-18 LAB
25(OH)D3+25(OH)D2 SERPL-MCNC: 22.9 NG/ML (ref 30–100)
ALBUMIN SERPL-MCNC: 4.5 G/DL (ref 3.8–4.8)
ALBUMIN/GLOB SERPL: 2.5 {RATIO} (ref 1.2–2.2)
ALP SERPL-CCNC: 35 IU/L (ref 39–117)
ALT SERPL-CCNC: 13 IU/L (ref 0–32)
AST SERPL-CCNC: 14 IU/L (ref 0–40)
BILIRUB SERPL-MCNC: 0.4 MG/DL (ref 0–1.2)
BUN SERPL-MCNC: 11 MG/DL (ref 6–24)
BUN/CREAT SERPL: 14 (ref 9–23)
CALCIUM SERPL-MCNC: 9 MG/DL (ref 8.7–10.2)
CHLORIDE SERPL-SCNC: 102 MMOL/L (ref 96–106)
CHOLEST SERPL-MCNC: 189 MG/DL (ref 100–199)
CO2 SERPL-SCNC: 24 MMOL/L (ref 20–29)
CREAT SERPL-MCNC: 0.78 MG/DL (ref 0.57–1)
GLOBULIN SER-MCNC: 1.8 G/DL (ref 1.5–4.5)
GLUCOSE SERPL-MCNC: 102 MG/DL (ref 65–99)
HDLC SERPL-MCNC: 73 MG/DL
LDLC SERPL CALC-MCNC: 100 MG/DL (ref 0–99)
POTASSIUM SERPL-SCNC: 3.6 MMOL/L (ref 3.5–5.2)
PROT SERPL-MCNC: 6.3 G/DL (ref 6–8.5)
SL AMB EGFR AFRICAN AMERICAN: 108 ML/MIN/1.73
SL AMB EGFR NON AFRICAN AMERICAN: 94 ML/MIN/1.73
SL AMB VLDL CHOLESTEROL CALC: 16 MG/DL (ref 5–40)
SODIUM SERPL-SCNC: 139 MMOL/L (ref 134–144)
T4 FREE SERPL-MCNC: 1.73 NG/DL (ref 0.82–1.77)
TRIGL SERPL-MCNC: 80 MG/DL (ref 0–149)
TSH SERPL DL<=0.005 MIU/L-ACNC: 2.15 UIU/ML (ref 0.45–4.5)

## 2020-07-21 ENCOUNTER — TELEMEDICINE (OUTPATIENT)
Dept: ENDOCRINOLOGY | Facility: HOSPITAL | Age: 45
End: 2020-07-21
Payer: COMMERCIAL

## 2020-07-21 ENCOUNTER — TELEPHONE (OUTPATIENT)
Dept: ENDOCRINOLOGY | Facility: HOSPITAL | Age: 45
End: 2020-07-21

## 2020-07-21 DIAGNOSIS — E27.1 ADDISON'S DISEASE (HCC): Primary | ICD-10-CM

## 2020-07-21 DIAGNOSIS — E03.8 HYPOTHYROIDISM DUE TO HASHIMOTO'S THYROIDITIS: ICD-10-CM

## 2020-07-21 DIAGNOSIS — E06.3 HASHIMOTO'S THYROIDITIS: ICD-10-CM

## 2020-07-21 DIAGNOSIS — E55.9 VITAMIN D DEFICIENCY: ICD-10-CM

## 2020-07-21 DIAGNOSIS — E06.3 HYPOTHYROIDISM DUE TO HASHIMOTO'S THYROIDITIS: ICD-10-CM

## 2020-07-21 DIAGNOSIS — E27.1 ADRENAL INSUFFICIENCY (ADDISON'S DISEASE) (HCC): ICD-10-CM

## 2020-07-21 PROCEDURE — 99214 OFFICE O/P EST MOD 30 MIN: CPT | Performed by: NURSE PRACTITIONER

## 2020-07-21 RX ORDER — LEVOTHYROXINE SODIUM 125 UG/1
TABLET ORAL
Qty: 90 TABLET | Refills: 2 | Status: SHIPPED | OUTPATIENT
Start: 2020-07-21 | End: 2021-05-24

## 2020-07-21 NOTE — PATIENT INSTRUCTIONS
Continue Synthroid 125 mcg Monday through Saturday and no tablet on Sunday      Recheck TSH and free T4 prior to next visit      Continue Hydrocortisone with Florinef at current doses      Obtain lab work as prescribed      Contact the office with any problems      Take your Vitamin D consistently

## 2020-07-21 NOTE — PROGRESS NOTES
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[address] )    -  Primary    Adrenal insufficiency (Luzerne's disease) ([facility] [address] )        Hypothyroidism due to Hashimoto's thyroiditis        Vitamin D deficiency                   Reason for visit is  Luzerne's disease, hypothyroidism and vitamin-D deficiency    Encounter provider TRICIA Acosta    Provider located at [address] [place] [address] [address] [place] [address]      Recent Visits  No visits were found meeting these conditions  Showing recent visits within past 7 days and meeting all other requirements     Today's Visits  Date Type Provider Dept   07/21/20 Telemedicine TRICIA Acosta  [facility]   Showing today's visits and meeting all other requirements     Future Appointments  No visits were found meeting these conditions  Showing future appointments within next 150 days and meeting all other requirements        The patient was identified by name and date of birth  Portia Shen was informed that this is a telemedicine visit and that the visit is being conducted through [employer] and patient was informed that this is not a secure, HIPAA-complaint platform  She agrees to proceed     My office door was closed  No one else was in the room  She acknowledged consent and understanding of privacy and security of the video platform  The patient has agreed to participate and understands they can discontinue the visit at any time  Patient is aware this is a billable service  Subjective  [address] is a 45 y o  female with history of primary autoimmune hypothyroidism, Jimi's disease, vitamin-D deficiency presents for follow-up  He was diagnosed with hypothyroidism over 10 years ago    Approximately 6 years ago, she was evaluated for hypotension, nausea, vomiting, weight loss and 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  She did have a hospitalization in November of 2017 with a stomach bug and required intravenous hydrocortisone  Currently, she treats with hydrocortisone 20 mg in the morning and 10 mg in the afternoon  She also treats with fludrocortisone 0 1 mg daily  She has not had any other recent illnesses and does not have any surgeries planned   She complains of some fatigue at times  [place] does experience some transient anxiety which appears to be more frequently lately since the COVID 19 pandemic  Renea Vaughanzac a emergency bracelet indicating she has adrenal insufficiency and is on chronic steroid replacement  She does report normal menstrual cycles  She denies any history of celiac, B12 deficiency, diabetes  She also has a history of hypothyroidism and takes Synthroid 125 mcg - 6 days per week   Most recent TSH from July 17, 2020 was 2 150 with a free T4 of 1 73       For her vitamin-D deficiency, she supplements with 1000 units of vitamin D3 daily  Her most recent vitamin-D 25 hydroxy level from July 17, 2020 is 22 9  HPI     No past medical history on file  No past surgical history on file  Current Outpatient Medications   Medication Sig Dispense Refill    fludrocortisone (FLORINEF) 0 1 mg tablet Take 1 tablet (0 1 mg total) by mouth daily 90 tablet 3    hydrocortisone (CORTEF) 10 mg tablet 20 mg in the morning and 10 mg in the afternoon   270 tablet 3    hydrocortisone sodium succinate, PF, (Solu-CORTEF) 100 mg SOLR Inject 2 mL (100 mg total) into a muscle as needed (In case of adrenal crisis) 2 mL 0    LORazepam (ATIVAN) 0 5 mg tablet Take by mouth      SYNTHROID 125 MCG tablet Take 1 tablet (125 mcg total) by mouth daily BRAND SYNTHROID NECESSARY 30 tablet 7    Syringe/Needle, Disp, (SYRINGE 3CC/23GX1") 23G X 1" 3 ML MISC by Does not apply route      Ascorbic Acid (VITAMIN C) 100 MG tablet Take by mouth      Cholecalciferol (VITAMIN D3) 1000 units CAPS Take by mouth      SYNTHROID 125 MCG tablet Take 1 tablet (125 mcg total) by mouth daily (Patient not taking: Reported on 1/21/2020) 30 tablet 11     No current facility-administered medications for this visit  No Known Allergies    Review of Systems   Constitutional: Positive for fatigue  Negative for chills and fever  HENT: Negative  Eyes: Negative  Respiratory: Negative  Negative for chest tightness and shortness of breath  Cardiovascular: Negative  Negative for chest pain  Gastrointestinal: Negative  Negative for abdominal pain, constipation, diarrhea and vomiting  Genitourinary: Negative  Musculoskeletal: Negative  Skin: Negative  Allergic/Immunologic: Negative  Neurological: Negative  Negative for dizziness, syncope, light-headedness and headaches  Hematological: Negative  Psychiatric/Behavioral: The patient is nervous/anxious (At times)  All other systems reviewed and are negative  Video Exam    There were no vitals filed for this visit  Physical Exam     Physical Exam   Constitutional: He is oriented to person, place, and time  He appears well-developed and well-nourished  No distress  HENT:   Head: Normocephalic and atraumatic  Neck: Normal range of motion  Pulmonary/Chest: Effort normal    Musculoskeletal: Normal range of motion  Neurological: He is alert and oriented to person, place, and time  Skin: He is not diaphoretic  Psychiatric: He has a normal mood and affect  His behavior is normal       Plan:  1   Jimi's disease:  Continue Hydrocortisone at current doses morning and afternoon  Check comprehensive metabolic panel and renin prior to next office visit      2   Hypothyroidism:  Her most recent TSH and free T4 are normal   She will continue Synthroid 125 mcg Monday through Saturday with no dose on Sunday    Recheck TSH and free T4  prior to next visit      3   Vitamin-D deficiency:  Her most recent 25 hydroxy vitamin-D level is slightly low  She has not been supplementing regularly with vitamin-D  Resume regular supplementation of 1000 units vitamin D3 daily      I spent 30 minutes directly with the patient during this visit      [address] acknowledges that she has consented to an online visit or consultation  She understands that the online visit is based solely on information provided by her, and that, in the absence of a face-to-face physical evaluation by the physician, the diagnosis she receives is both limited and provisional in terms of accuracy and completeness  This is not intended to replace a full medical face-to-face evaluation by the physician  [address] understands and accepts these terms      Established outpatient follow-up [id number]

## 2020-07-21 NOTE — TELEPHONE ENCOUNTER
Patient was getting back to you with name & address of her work contact in which to send a work letter to address her possibly not returning to work (or working from home) due to her medical condition   Letter needs to be addressed:    Jackelyn Horn  Attn: Rhiannon Lawrence  [address] [place], [address] [facility]

## 2020-07-22 NOTE — TELEPHONE ENCOUNTER
Pt called back and said she changed her mind about the work letter  She may want it sent in the future but does not want one sent right now

## 2020-11-24 DIAGNOSIS — E27.1 ADDISON'S DISEASE (HCC): ICD-10-CM

## 2020-11-25 RX ORDER — HYDROCORTISONE SOD SUCCINATE 100 MG
VIAL (EA) INJECTION
Qty: 2 ML | Refills: 0 | Status: SHIPPED | OUTPATIENT
Start: 2020-11-25 | End: 2021-12-08

## 2021-02-05 LAB
25(OH)D3+25(OH)D2 SERPL-MCNC: 24.3 NG/ML (ref 30–100)
ALBUMIN SERPL-MCNC: 4.4 G/DL (ref 3.8–4.8)
ALBUMIN/GLOB SERPL: 2 {RATIO} (ref 1.2–2.2)
ALP SERPL-CCNC: 38 IU/L (ref 39–117)
ALT SERPL-CCNC: 17 IU/L (ref 0–32)
AST SERPL-CCNC: 14 IU/L (ref 0–40)
BILIRUB SERPL-MCNC: 0.4 MG/DL (ref 0–1.2)
BUN SERPL-MCNC: 17 MG/DL (ref 6–24)
BUN/CREAT SERPL: 21 (ref 9–23)
CALCIUM SERPL-MCNC: 9.2 MG/DL (ref 8.7–10.2)
CHLORIDE SERPL-SCNC: 102 MMOL/L (ref 96–106)
CO2 SERPL-SCNC: 23 MMOL/L (ref 20–29)
CREAT SERPL-MCNC: 0.81 MG/DL (ref 0.57–1)
GLOBULIN SER-MCNC: 2.2 G/DL (ref 1.5–4.5)
GLUCOSE SERPL-MCNC: 97 MG/DL (ref 65–99)
POTASSIUM SERPL-SCNC: 4 MMOL/L (ref 3.5–5.2)
PROT SERPL-MCNC: 6.6 G/DL (ref 6–8.5)
SL AMB EGFR AFRICAN AMERICAN: 103 ML/MIN/1.73
SL AMB EGFR NON AFRICAN AMERICAN: 89 ML/MIN/1.73
SODIUM SERPL-SCNC: 137 MMOL/L (ref 134–144)
T4 FREE SERPL-MCNC: 1.77 NG/DL (ref 0.82–1.77)
TSH SERPL DL<=0.005 MIU/L-ACNC: 1.55 UIU/ML (ref 0.45–4.5)

## 2021-02-17 ENCOUNTER — TELEMEDICINE (OUTPATIENT)
Dept: ENDOCRINOLOGY | Facility: HOSPITAL | Age: 46
End: 2021-02-17
Payer: COMMERCIAL

## 2021-02-17 DIAGNOSIS — E27.1 ADRENAL INSUFFICIENCY (ADDISON'S DISEASE) (HCC): ICD-10-CM

## 2021-02-17 DIAGNOSIS — E27.1 ADDISON'S DISEASE (HCC): ICD-10-CM

## 2021-02-17 DIAGNOSIS — E06.3 HYPOTHYROIDISM DUE TO HASHIMOTO'S THYROIDITIS: ICD-10-CM

## 2021-02-17 DIAGNOSIS — E55.9 VITAMIN D DEFICIENCY: ICD-10-CM

## 2021-02-17 DIAGNOSIS — E06.3 HASHIMOTO'S THYROIDITIS: Primary | ICD-10-CM

## 2021-02-17 DIAGNOSIS — E03.8 HYPOTHYROIDISM DUE TO HASHIMOTO'S THYROIDITIS: ICD-10-CM

## 2021-02-17 DIAGNOSIS — E27.1 PRIMARY ADRENOCORTICAL INSUFFICIENCY (HCC): ICD-10-CM

## 2021-02-17 PROCEDURE — 99214 OFFICE O/P EST MOD 30 MIN: CPT | Performed by: NURSE PRACTITIONER

## 2021-02-17 NOTE — PROGRESS NOTES
Virtual Regular Visit    Problem List Items Addressed This Visit     None      Visit Diagnoses     Hashimoto's thyroiditis    -  Primary    Primary adrenocortical insufficiency (HCC)        Adrenal insufficiency (Jimi's disease) ([facility] )        [place]'s disease ([facility] [address] )        Hypothyroidism due to Hashimoto's thyroiditis        Vitamin D deficiency            Reason for visit is [place]'s disease, hypothyroidism and vitamin-D deficiency     Encounter provider TRICIA Quevedo    Provider located at [address] [place] [address] [address] [place] [address]      Recent Visits  No visits were found meeting these conditions  Showing recent visits within past 7 days and meeting all other requirements     Today's Visits  Date Type Provider Dept   02/17/21 Telemedicine TRICIA Quevedo [facility] [place]   Showing today's visits and meeting all other requirements     Future Appointments  No visits were found meeting these conditions  Showing future appointments within next 150 days and meeting all other requirements        The patient was identified by name and date of birth  Melissa Hyde was informed that this is a telemedicine visit and that the visit is being conducted through [employer] and patient was informed that this is not a secure, HIPAA-compliant platform  She agrees to proceed     My office door was closed  No one else was in the room  She acknowledged consent and understanding of privacy and security of the video platform  The patient has agreed to participate and understands they can discontinue the visit at any time  Patient is aware this is a billable service  Subjective  Melissa Hyde is a 39 y o  female with history of primary autoimmune hypothyroidism, [place]'s disease, vitamin-D deficiency presents for follow-up   He was diagnosed with hypothyroidism over 10 years ago   Approximately 6 years ago, she was evaluated for hypotension, nausea, vomiting, weight loss and 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  She did have a hospitalization in November of 2017 with a stomach bug and required intravenous hydrocortisone  Currently, she treats with hydrocortisone 20 mg in the morning and 10 mg in the afternoon  She also treats with fludrocortisone 0 1 mg daily  She has not had any other recent illnesses and does not have any surgeries planned   She complains of some fatigue at times  Fernando Riley does experience some transient anxiety which appears to be more frequently lately since the COVID 19 pandemic  Hossein Dwightton a emergency bracelet indicating she has adrenal insufficiency and is on chronic steroid replacement  She does report normal menstrual cycles  She denies any history of celiac, B12 deficiency, diabetes       She also has a history of hypothyroidism and takes Synthroid 125 mcg - 6 days per week   Most recent TSH from February 4, 2021 is 1 55 with a free T4 of 1 77       For her vitamin-D deficiency, she supplements with 2000 units of vitamin D3 daily   Her most recent vitamin-D 25 hydroxy level from February 4, 2021 is 24 3  HPI     No past medical history on file  No past surgical history on file  Current Outpatient Medications   Medication Sig Dispense Refill    Ascorbic Acid (VITAMIN C) 100 MG tablet Take by mouth      Cholecalciferol (VITAMIN D3) 1000 units CAPS Take by mouth      fludrocortisone (FLORINEF) 0 1 mg tablet Take 1 tablet (0 1 mg total) by mouth daily 90 tablet 3    hydrocortisone (CORTEF) 10 mg tablet 20 mg in the morning and 10 mg in the afternoon   270 tablet 3    LORazepam (ATIVAN) 0 5 mg tablet Take by mouth      Solu-CORTEF 100 MG SOLR INJECT 2 ML (100 MG TOTAL) INTO A MUSCLE AS NEEDED (IN CASE OF ADRENAL CRISIS) 2 mL 0    SYNTHROID 125 MCG tablet Take 1 tablet daily Monday - Saturday BRAND SYNTHROID NECESSARY 90 tablet 2    Syringe/Needle, Disp, (SYRINGE 3CC/23GX1") 23G X 1" 3 ML MISC by Does not apply route      SYNTHROID 125 MCG tablet Take 1 tablet (125 mcg total) by mouth daily (Patient not taking: Reported on 1/21/2020) 30 tablet 11     No current facility-administered medications for this visit  No Known Allergies    Review of Systems   Constitutional: Positive for fatigue ( at times)  Negative for chills and fever  HENT: Negative  Negative for trouble swallowing and voice change  Eyes: Negative  Negative for visual disturbance  Respiratory: Negative  Negative for chest tightness and shortness of breath  Cardiovascular: Negative  Negative for chest pain  Gastrointestinal: Negative  Negative for abdominal pain, constipation, diarrhea and vomiting  Endocrine: Negative for cold intolerance, heat intolerance, polydipsia, polyphagia and polyuria  Genitourinary: Negative  Musculoskeletal: Negative  Skin: Negative  Allergic/Immunologic: Negative  Neurological: Negative  Negative for dizziness, syncope, light-headedness and headaches  Hematological: Negative  Psychiatric/Behavioral: The patient is nervous/anxious  All other systems reviewed and are negative  Video Exam    There were no vitals filed for this visit  Physical Exam     Physical Exam   Constitutional: she is oriented to person, place, and time  she appears well-developed and well-nourished  No distress  HENT:   Head: Normocephalic and atraumatic  Neck: Normal range of motion  Pulmonary/Chest: Effort normal    Musculoskeletal: Normal range of motion  Neurological: she is alert and oriented to person, place, and time  Skin: she is not diaphoretic  Psychiatric: she has a normal mood and affect   her behavior is normal       Plan:  1   [place]'s disease:  Continue Hydrocortisone at current doses morning and afternoon  Check comprehensive metabolic panel and renin prior to next office visit      2   Hypothyroidism:  Her most recent TSH and free T4 are normal   She will continue Synthroid 125 mcg Monday through Saturday with no dose on Sunday   Recheck TSH and free T4  prior to next visit      3   Vitamin-D deficiency: Dozier Nyhan most recent 25 hydroxy vitamin-D level is slightly low  I have asked her to increase her dose of vitamin-D supplementation to 4000 units daily  Recheck 25 hydroxy vitamin-D level prior to next office visit      I spent 30 minutes directly with the patient during this visit       VIRTUAL VISIT Carl Wright acknowledges that she has consented to an online visit or consultation  She understands that the online visit is based solely on information provided by her, and that, in the absence of a face-to-face physical evaluation by the physician, the diagnosis she receives is both limited and provisional in terms of accuracy and completeness  This is not intended to replace a full medical face-to-face evaluation by the physician  [address] understands and accepts these terms

## 2021-02-17 NOTE — PATIENT INSTRUCTIONS
Continue Synthroid 125 mcg Monday through Saturday and no tablet on Sunday      Recheck TSH and free T4 prior to next visit      Continue Hydrocortisone with Florinef at current doses      Obtain lab work as prescribed      Contact the office with any problems      As discussed, take 4000 units Vitamin D consistently

## 2021-02-26 DIAGNOSIS — E27.1 ADDISON'S DISEASE (HCC): ICD-10-CM

## 2021-03-02 RX ORDER — HYDROCORTISONE 10 MG/1
TABLET ORAL
Qty: 120 TABLET | Refills: 7 | Status: SHIPPED | OUTPATIENT
Start: 2021-03-02 | End: 2022-01-04

## 2021-03-10 DIAGNOSIS — Z23 ENCOUNTER FOR IMMUNIZATION: ICD-10-CM

## 2021-05-23 DIAGNOSIS — E06.3 HASHIMOTO'S THYROIDITIS: ICD-10-CM

## 2021-05-24 RX ORDER — LEVOTHYROXINE SODIUM 125 UG/1
TABLET ORAL
Qty: 90 TABLET | Refills: 2 | Status: SHIPPED | OUTPATIENT
Start: 2021-05-24 | End: 2022-02-22

## 2021-06-16 DIAGNOSIS — E27.1 ADRENAL INSUFFICIENCY (ADDISON'S DISEASE) (HCC): ICD-10-CM

## 2021-06-16 RX ORDER — FLUDROCORTISONE ACETATE 0.1 MG/1
0.1 TABLET ORAL DAILY
Qty: 90 TABLET | Refills: 3 | Status: SHIPPED | OUTPATIENT
Start: 2021-06-16 | End: 2022-06-16

## 2021-08-11 LAB
25(OH)D3+25(OH)D2 SERPL-MCNC: 28.5 NG/ML (ref 30–100)
ALBUMIN SERPL-MCNC: 4.3 G/DL (ref 3.8–4.8)
ALBUMIN/GLOB SERPL: 2.2 {RATIO} (ref 1.2–2.2)
ALP SERPL-CCNC: 34 IU/L (ref 48–121)
ALT SERPL-CCNC: 12 IU/L (ref 0–32)
AST SERPL-CCNC: 14 IU/L (ref 0–40)
BILIRUB SERPL-MCNC: 0.3 MG/DL (ref 0–1.2)
BUN SERPL-MCNC: 12 MG/DL (ref 6–24)
BUN/CREAT SERPL: 18 (ref 9–23)
CALCIUM SERPL-MCNC: 9.2 MG/DL (ref 8.7–10.2)
CHLORIDE SERPL-SCNC: 102 MMOL/L (ref 96–106)
CHOLEST SERPL-MCNC: 221 MG/DL (ref 100–199)
CO2 SERPL-SCNC: 24 MMOL/L (ref 20–29)
CREAT SERPL-MCNC: 0.66 MG/DL (ref 0.57–1)
GLOBULIN SER-MCNC: 2 G/DL (ref 1.5–4.5)
GLUCOSE SERPL-MCNC: 95 MG/DL (ref 65–99)
HDLC SERPL-MCNC: 67 MG/DL
LDLC SERPL CALC-MCNC: 140 MG/DL (ref 0–99)
POTASSIUM SERPL-SCNC: 4.1 MMOL/L (ref 3.5–5.2)
PROT SERPL-MCNC: 6.3 G/DL (ref 6–8.5)
SL AMB EGFR AFRICAN AMERICAN: 125 ML/MIN/1.73
SL AMB EGFR NON AFRICAN AMERICAN: 109 ML/MIN/1.73
SL AMB VLDL CHOLESTEROL CALC: 14 MG/DL (ref 5–40)
SODIUM SERPL-SCNC: 137 MMOL/L (ref 134–144)
T4 FREE SERPL-MCNC: 1.16 NG/DL (ref 0.82–1.77)
TRIGL SERPL-MCNC: 81 MG/DL (ref 0–149)
TSH SERPL DL<=0.005 MIU/L-ACNC: 1.88 UIU/ML (ref 0.45–4.5)

## 2021-08-18 ENCOUNTER — OFFICE VISIT (OUTPATIENT)
Dept: ENDOCRINOLOGY | Facility: HOSPITAL | Age: 46
End: 2021-08-18
Payer: COMMERCIAL

## 2021-08-18 VITALS
HEIGHT: 60 IN | WEIGHT: 150.8 LBS | BODY MASS INDEX: 29.61 KG/M2 | DIASTOLIC BLOOD PRESSURE: 80 MMHG | SYSTOLIC BLOOD PRESSURE: 122 MMHG | HEART RATE: 101 BPM

## 2021-08-18 DIAGNOSIS — E27.1 ADDISON'S DISEASE (HCC): ICD-10-CM

## 2021-08-18 DIAGNOSIS — E06.3 HYPOTHYROIDISM DUE TO HASHIMOTO'S THYROIDITIS: ICD-10-CM

## 2021-08-18 DIAGNOSIS — E55.9 VITAMIN D DEFICIENCY: ICD-10-CM

## 2021-08-18 DIAGNOSIS — E27.1 ADRENAL INSUFFICIENCY (ADDISON'S DISEASE) (HCC): Primary | ICD-10-CM

## 2021-08-18 DIAGNOSIS — E06.3 HASHIMOTO'S THYROIDITIS: ICD-10-CM

## 2021-08-18 DIAGNOSIS — E03.8 HYPOTHYROIDISM DUE TO HASHIMOTO'S THYROIDITIS: ICD-10-CM

## 2021-08-18 DIAGNOSIS — E27.1 PRIMARY ADRENOCORTICAL INSUFFICIENCY (HCC): ICD-10-CM

## 2021-08-18 PROCEDURE — 99214 OFFICE O/P EST MOD 30 MIN: CPT | Performed by: NURSE PRACTITIONER

## 2021-08-18 NOTE — PROGRESS NOTES
[address] 39 y o  female MRN: [id number]    Encounter: [id number]      Assessment/Plan     Assessment: This is a 39y o -year-old female with Jimi's disease, hypothyroidism and vitamin-D deficiency     Plan:  1   [place]'s disease:  Continue Hydrocortisone at current doses morning and afternoon  Check comprehensive metabolic panel prior to next office visit      2   Hypothyroidism:  Her most recent TSH and free T4 are normal   She will continue Synthroid 125 mcg Monday through [address] no dose on Sunday   Recheck TSH and free T4  prior to next visit      3   Vitamin-D deficiency: Veola  most recent 25 hydroxy vitamin-D level is slightly low  I have asked her to continue her dose of vitamin-D supplementation at 4000 units daily -consistently  Recheck 25 hydroxy vitamin-D level prior to next office visit  4    Hyperlipidemia:  LDL has elevated to 140  She has adopted a [employer]  Discussed the importance of a proper diet and exercise  Will recheck prior to next visit to continue surveillance  CC:  Tyler's disease/hypothyroidism follow-up    History of Present Illness     HPI:  39 y o  female with history of primary autoimmune hypothyroidism, [place]'s disease, vitamin-D deficiency presents for follow-up  He was diagnosed with hypothyroidism over 10 years ago   Approximately 6 years ago, she was evaluated for hypotension, nausea, vomiting, weight loss and 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  Currently, she treats with hydrocortisone 20 mg in the morning and 10 mg in the afternoon  She also treats with fludrocortisone 0 1 mg daily  She has not had any other recent illnesses and does not have any surgeries planned   She complains of some fatigue at times  She does have a emergency bracelet indicating she has adrenal insufficiency and is on chronic steroid replacement  She does report normal menstrual cycles   She denies any history of celiac, B12 deficiency, diabetes       She also has a history of hypothyroidism and takes Synthroid 125 mcg - 6 days per week   Most recent TSH from August 10, 2021 is 1 880 with a free T4 of 1  16       For her vitamin-D deficiency, she supplements with 2000 units of vitamin D3 daily   Her most recent vitamin-D 25 hydroxy level from August 10, 2021 is  28 5  Review of Systems   Constitutional: Positive for fatigue  Negative for chills and fever  HENT: Negative  Negative for trouble swallowing and voice change  Eyes: Negative  Negative for visual disturbance  Respiratory: Negative  Negative for chest tightness and shortness of breath  Cardiovascular: Negative  Negative for chest pain  Gastrointestinal: Negative  Negative for abdominal pain, constipation, diarrhea and vomiting  Endocrine: Negative for cold intolerance, heat intolerance, polydipsia, polyphagia and polyuria  Genitourinary: Negative  Musculoskeletal: Negative  Skin: Negative  Allergic/Immunologic: Negative  Neurological: Negative  Negative for dizziness, syncope, light-headedness and headaches  Hematological: Negative  Psychiatric/Behavioral: The patient is nervous/anxious  All other systems reviewed and are negative  Historical Information   No past medical history on file  No past surgical history on file    Social History   Social History     Substance and Sexual Activity   Alcohol Use None     Social History     Substance and Sexual Activity   Drug Use Not on file     Social History     Tobacco Use   Smoking Status Never Smoker   Smokeless Tobacco Never Used     Family History:   Family History   Problem Relation Age of Onset    Hypothyroidism Mother     Atrial fibrillation Father        Meds/Allergies   Current Outpatient Medications   Medication Sig Dispense Refill    Ascorbic Acid (VITAMIN C) 100 MG tablet Take by mouth      Cholecalciferol (VITAMIN D3) 1000 units CAPS Take by mouth      fludrocortisone (FLORINEF) 0 1 mg tablet Take 1 tablet (0 1 mg total) by mouth daily 90 tablet 3    hydrocortisone (CORTEF) 10 mg tablet PLEASE SEE ATTACHED FOR DETAILED DIRECTIONS OR USE AS DIRECTED BY  tablet 7    LORazepam (ATIVAN) 0 5 mg tablet Take by mouth      Solu-CORTEF 100 MG SOLR INJECT 2 ML (100 MG TOTAL) INTO A MUSCLE AS NEEDED (IN CASE OF ADRENAL CRISIS) 2 mL 0    Synthroid 125 MCG tablet TAKE 1 TABLET DAILY MONDAY - SATURDAY BRAND SYNTHROID NECESSARY 90 tablet 2    Syringe/Needle, Disp, (SYRINGE 3CC/23GX1") 23G X 1" 3 ML MISC by Does not apply route      SYNTHROID 125 MCG tablet Take 1 tablet (125 mcg total) by mouth daily (Patient not taking: Reported on 1/21/2020) 30 tablet 11     No current facility-administered medications for this visit  Allergies   Allergen Reactions    Amoxicillin Other (See Comments)       Objective   Vitals: Blood pressure 122/80, pulse 101, height 5' (1 524 m), weight 68 4 kg (150 lb 12 8 oz)  Physical Exam  Vitals reviewed  Constitutional:       Appearance: She is well-developed  HENT:      Head: Normocephalic and atraumatic  Eyes:      Conjunctiva/sclera: Conjunctivae normal       Pupils: Pupils are equal, round, and reactive to light  Cardiovascular:      Rate and Rhythm: Normal rate and regular rhythm  Heart sounds: Normal heart sounds  Pulmonary:      Effort: Pulmonary effort is normal       Breath sounds: Normal breath sounds  Abdominal:      General: Bowel sounds are normal       Palpations: Abdomen is soft  Musculoskeletal:         General: Normal range of motion  Cervical back: Normal range of motion and neck supple  Skin:     General: Skin is warm and dry  Neurological:      Mental Status: She is alert and oriented to person, place, and time  Psychiatric:         Behavior: Behavior normal          Thought Content:  Thought content normal          Judgment: Judgment normal          Lab Results:   Lab Results   Component Value Date/Time    Free t4 1 16 08/10/2021 07:39 AM Free t4 1 77 02/04/2021 07:36 AM       Portions of the record may have been created with voice recognition software  Occasional wrong word or "sound a like" substitutions may have occurred due to the inherent limitations of voice recognition software  Read the chart carefully and recognize, using context, where substitutions have occurred

## 2021-12-02 ENCOUNTER — TELEPHONE (OUTPATIENT)
Dept: OBGYN CLINIC | Facility: CLINIC | Age: 46
End: 2021-12-02

## 2021-12-02 DIAGNOSIS — Z12.31 ENCOUNTER FOR SCREENING MAMMOGRAM FOR MALIGNANT NEOPLASM OF BREAST: Primary | ICD-10-CM

## 2021-12-08 DIAGNOSIS — E27.1 ADDISON'S DISEASE (HCC): ICD-10-CM

## 2021-12-08 RX ORDER — HYDROCORTISONE SOD SUCCINATE 100 MG
VIAL (EA) INJECTION
Qty: 2 ML | Refills: 0 | Status: SHIPPED | OUTPATIENT
Start: 2021-12-08

## 2022-01-04 DIAGNOSIS — E27.1 ADDISON'S DISEASE (HCC): ICD-10-CM

## 2022-01-04 RX ORDER — HYDROCORTISONE 10 MG/1
TABLET ORAL
Qty: 120 TABLET | Refills: 7 | Status: SHIPPED | OUTPATIENT
Start: 2022-01-04 | End: 2022-01-04

## 2022-02-15 LAB
25(OH)D3+25(OH)D2 SERPL-MCNC: 40.1 NG/ML (ref 30–100)
ALBUMIN SERPL-MCNC: 4.5 G/DL (ref 3.8–4.8)
ALBUMIN/GLOB SERPL: 2.4 {RATIO} (ref 1.2–2.2)
ALP SERPL-CCNC: 41 IU/L (ref 44–121)
ALT SERPL-CCNC: 17 IU/L (ref 0–32)
AST SERPL-CCNC: 19 IU/L (ref 0–40)
BILIRUB SERPL-MCNC: 0.6 MG/DL (ref 0–1.2)
BUN SERPL-MCNC: 7 MG/DL (ref 6–24)
BUN/CREAT SERPL: 9 (ref 9–23)
CALCIUM SERPL-MCNC: 9.4 MG/DL (ref 8.7–10.2)
CHLORIDE SERPL-SCNC: 99 MMOL/L (ref 96–106)
CHOLEST SERPL-MCNC: 161 MG/DL (ref 100–199)
CO2 SERPL-SCNC: 22 MMOL/L (ref 20–29)
CREAT SERPL-MCNC: 0.81 MG/DL (ref 0.57–1)
GLOBULIN SER-MCNC: 1.9 G/DL (ref 1.5–4.5)
GLUCOSE SERPL-MCNC: 94 MG/DL (ref 65–99)
HDLC SERPL-MCNC: 59 MG/DL
LDLC SERPL CALC-MCNC: 90 MG/DL (ref 0–99)
POTASSIUM SERPL-SCNC: 3.8 MMOL/L (ref 3.5–5.2)
PROT SERPL-MCNC: 6.4 G/DL (ref 6–8.5)
SL AMB EGFR AFRICAN AMERICAN: 102 ML/MIN/1.73
SL AMB EGFR NON AFRICAN AMERICAN: 89 ML/MIN/1.73
SL AMB VLDL CHOLESTEROL CALC: 12 MG/DL (ref 5–40)
SODIUM SERPL-SCNC: 135 MMOL/L (ref 134–144)
T4 FREE SERPL-MCNC: 1.46 NG/DL (ref 0.82–1.77)
TRIGL SERPL-MCNC: 61 MG/DL (ref 0–149)
TSH SERPL DL<=0.005 MIU/L-ACNC: 1.73 UIU/ML (ref 0.45–4.5)

## 2022-02-20 DIAGNOSIS — E06.3 HASHIMOTO'S THYROIDITIS: ICD-10-CM

## 2022-02-22 ENCOUNTER — OFFICE VISIT (OUTPATIENT)
Dept: ENDOCRINOLOGY | Facility: HOSPITAL | Age: 47
End: 2022-02-22
Payer: COMMERCIAL

## 2022-02-22 VITALS
OXYGEN SATURATION: 99 % | HEIGHT: 61 IN | SYSTOLIC BLOOD PRESSURE: 122 MMHG | DIASTOLIC BLOOD PRESSURE: 70 MMHG | WEIGHT: 153.8 LBS | BODY MASS INDEX: 29.04 KG/M2 | HEART RATE: 75 BPM

## 2022-02-22 DIAGNOSIS — E06.3 HASHIMOTO'S THYROIDITIS: Primary | ICD-10-CM

## 2022-02-22 DIAGNOSIS — E27.1 ADDISON'S DISEASE (HCC): ICD-10-CM

## 2022-02-22 DIAGNOSIS — E27.1 ADRENAL INSUFFICIENCY (ADDISON'S DISEASE) (HCC): ICD-10-CM

## 2022-02-22 DIAGNOSIS — E55.9 VITAMIN D DEFICIENCY: ICD-10-CM

## 2022-02-22 PROCEDURE — 99214 OFFICE O/P EST MOD 30 MIN: CPT | Performed by: NURSE PRACTITIONER

## 2022-02-22 RX ORDER — LEVOTHYROXINE SODIUM 125 UG/1
TABLET ORAL
Qty: 90 TABLET | Refills: 2 | Status: SHIPPED | OUTPATIENT
Start: 2022-02-22 | End: 2022-06-27 | Stop reason: SDUPTHER

## 2022-02-22 RX ORDER — MULTIVITAMIN
1 CAPSULE ORAL DAILY
COMMUNITY

## 2022-02-22 NOTE — PROGRESS NOTES
[address] 42 y o  female MRN: [id number]    Encounter: [id number]      Assessment/Plan     Assessment: This is a 42y o -year-old female with Jimi's disease, hypothyroidism and vitamin-D deficiency      Plan:  1   Jimi's disease: Sodium and potassium are normal   She is normotensive in the office today   Continue Hydrocortisone at current doses morning and afternoon  Check comprehensive metabolic panel prior to next office visit      2   Hypothyroidism:  Her most recent TSH and free T4 are normal   She will continue Synthroid 125 mcg Monday through [address] no dose on Sunday   Recheck TSH and free T4  prior to next visit      3   Vitamin-D deficiency: Lamont Goemz most recent 25 hydroxy vitamin-D level is  Normal at 40 1   I have asked her to continue her dose of vitamin-D supplementation at 4000 units daily -consistently  Will continue to monitor over time      4  Hyperlipidemia:  LDL has improved to 90  She has adopted a plant based diet after the first of the year  Discussed the importance of a proper diet and exercise  Will recheck prior to next visit to continue surveillance  CC: Jimi's disease/hypothyroidism follow-up    History of Present Illness     HPI:  42 y  o  female with history of primary autoimmune hypothyroidism, [place]'s disease, vitamin-D deficiency presents for follow-up  He was diagnosed with hypothyroidism over 10 years ago   Approximately 6 years ago, she was evaluated for hypotension, nausea, vomiting, weight loss and discovered to have [place]'s disease  Currently, she treats with hydrocortisone 20 mg in the morning and 10 mg in the afternoon  She also treats with fludrocortisone 0 1 mg daily  She has not had any other recent illnesses and does not have any surgeries planned   She complains of some fatigue at times  She does have a emergency bracelet indicating she has adrenal insufficiency and is on chronic steroid replacement  She does report normal menstrual cycles  She denies any history of celiac, B12 deficiency, diabetes       She also has a history of hypothyroidism and takes Synthroid 125 mcg - 6 days per week   Most recent TSH from  February 14, 2022 is 1 730 with a free T4 of 1 46       For her vitamin-D deficiency, she supplements with 2000 units of vitamin D3 daily   Her most recent vitamin-D 25 hydroxy level from  February 14, 2022 is 40 1      Review of Systems   Constitutional: Positive for fatigue  Negative for chills and fever  HENT: Negative  Negative for trouble swallowing and voice change  Eyes: Negative  Negative for visual disturbance  Respiratory: Negative  Negative for chest tightness and shortness of breath  Cardiovascular: Negative  Negative for chest pain  Gastrointestinal: Negative  Negative for abdominal pain, constipation, diarrhea and vomiting  Endocrine: Negative for cold intolerance, heat intolerance, polydipsia, polyphagia and polyuria  Genitourinary: Negative  Musculoskeletal: Negative  Skin: Negative  Allergic/Immunologic: Negative  Neurological: Negative  Negative for dizziness, syncope, light-headedness and headaches  Hematological: Negative  Psychiatric/Behavioral: The patient is nervous/anxious  All other systems reviewed and are negative  Historical Information   History reviewed  No pertinent past medical history  History reviewed  No pertinent surgical history    Social History   Social History     Substance and Sexual Activity   Alcohol Use None     Social History     Substance and Sexual Activity   Drug Use Not on file     Social History     Tobacco Use   Smoking Status Never Smoker   Smokeless Tobacco Never Used     Family History:   Family History   Problem Relation Age of Onset    Hypothyroidism Mother     Atrial fibrillation Father        Meds/Allergies   Current Outpatient Medications   Medication Sig Dispense Refill    Ascorbic Acid (VITAMIN C) 100 MG tablet Take by mouth      Cholecalciferol (VITAMIN D3) 1000 units CAPS Take by mouth      cyanocobalamin (VITAMIN B-12) 100 MCG tablet Take 100 mcg by mouth daily      fludrocortisone (FLORINEF) 0 1 mg tablet Take 1 tablet (0 1 mg total) by mouth daily 90 tablet 3    hydrocortisone (CORTEF) 10 mg tablet Take 2 tablets in the morning and 1 tablet in the afternoon  120 tablet 7    LORazepam (ATIVAN) 0 5 mg tablet Take by mouth      Multiple Vitamin (multivitamin) capsule Take 1 capsule by mouth daily      Solu-CORTEF 100 MG SOLR INJECT 2 ML (100 MG TOTAL) INTO A MUSCLE AS NEEDED (IN CASE OF ADRENAL CRISIS) 2 mL 0    Synthroid 125 MCG tablet TAKE 1 TABLET DAILY MONDAY - SATURDAY BRAND SYNTHROID NECESSARY 90 tablet 2    Syringe/Needle, Disp, (SYRINGE 3CC/23GX1") 23G X 1" 3 ML MISC by Does not apply route      SYNTHROID 125 MCG tablet Take 1 tablet (125 mcg total) by mouth daily (Patient not taking: Reported on 1/21/2020) 30 tablet 11     No current facility-administered medications for this visit  Allergies   Allergen Reactions    Amoxicillin Other (See Comments)       Objective   Vitals: Blood pressure 122/70, pulse 75, height 5' 1" (1 549 m), weight 69 8 kg (153 lb 12 8 oz), SpO2 99 %  Physical Exam  Vitals reviewed  Constitutional:       Appearance: She is well-developed  HENT:      Head: Normocephalic and atraumatic  Eyes:      Conjunctiva/sclera: Conjunctivae normal       Pupils: Pupils are equal, round, and reactive to light  Cardiovascular:      Rate and Rhythm: Normal rate and regular rhythm  Heart sounds: Normal heart sounds  Pulmonary:      Effort: Pulmonary effort is normal       Breath sounds: Normal breath sounds  Abdominal:      General: Bowel sounds are normal       Palpations: Abdomen is soft  Musculoskeletal:         General: Normal range of motion  Cervical back: Normal range of motion and neck supple  Skin:     General: Skin is warm and dry     Neurological:      Mental Status: She is alert and oriented to person, place, and time  Psychiatric:         Behavior: Behavior normal          Thought Content: Thought content normal          Judgment: Judgment normal        Lab Results:   Lab Results   Component Value Date/Time    Free t4 1 46 02/14/2022 11:45 AM    Free t4 1 16 08/10/2021 07:39 AM     Portions of the record may have been created with voice recognition software  Occasional wrong word or "sound a like" substitutions may have occurred due to the inherent limitations of voice recognition software  Read the chart carefully and recognize, using context, where substitutions have occurred

## 2022-04-05 ENCOUNTER — VBI (OUTPATIENT)
Dept: ADMINISTRATIVE | Facility: OTHER | Age: 47
End: 2022-04-05

## 2022-04-05 NOTE — TELEPHONE ENCOUNTER
Her levels overall look good for her thyroid  She can discuss further at appointment on 3/26  There are no Wet Read(s) to document.

## 2022-04-05 NOTE — TELEPHONE ENCOUNTER
Upon review of the In Basket request we were able to locate, review, and update the patient chart as requested for Pap Smear (HPV) aka Cervical Cancer Screening  Any additional questions or concerns should be emailed to the Practice Liaisons via [email or web address] email, please do not reply via In Basket      Thank you  Lori Gary

## 2022-04-13 ENCOUNTER — ANNUAL EXAM (OUTPATIENT)
Dept: OBGYN CLINIC | Facility: CLINIC | Age: 47
End: 2022-04-13
Payer: COMMERCIAL

## 2022-04-13 VITALS
WEIGHT: 154.6 LBS | SYSTOLIC BLOOD PRESSURE: 120 MMHG | HEIGHT: 61 IN | BODY MASS INDEX: 29.19 KG/M2 | DIASTOLIC BLOOD PRESSURE: 74 MMHG

## 2022-04-13 DIAGNOSIS — Z12.4 SCREENING FOR CERVICAL CANCER: ICD-10-CM

## 2022-04-13 DIAGNOSIS — Z12.31 ENCOUNTER FOR SCREENING MAMMOGRAM FOR MALIGNANT NEOPLASM OF BREAST: Primary | ICD-10-CM

## 2022-04-13 DIAGNOSIS — Z01.419 ENCNTR FOR GYN EXAM (GENERAL) (ROUTINE) W/O ABN FINDINGS: ICD-10-CM

## 2022-04-13 DIAGNOSIS — B37.3 VAGINAL YEAST INFECTION: ICD-10-CM

## 2022-04-13 PROCEDURE — S0612 ANNUAL GYNECOLOGICAL EXAMINA: HCPCS | Performed by: OBSTETRICS & GYNECOLOGY

## 2022-04-13 RX ORDER — FLUCONAZOLE 150 MG/1
150 TABLET ORAL ONCE
Qty: 1 TABLET | Refills: 0 | Status: SHIPPED | OUTPATIENT
Start: 2022-04-13 | End: 2022-04-13

## 2022-04-13 NOTE — PROGRESS NOTES
Annual Wellness Visit  [address]   [address], [address], [facility] [place] [facility]    ASSESSMENT/PLAN: Marcia Henley is a 42 y o   who presents for annual gynecologic exam     Encounter for routine gynecologic examination  - Routine well woman exam completed today  - Cervical Cancer Screening: Current ASCCP Guidelines reviewed  Last Pap: 2017  Next Pap Due: today, routine   - STI screening offered including HIV testing: offered, pt declined  - Contraceptive counseling discussed  Current contraception: vasectomy  - Breast Cancer Screening: Last Mammogram 2021  - The following were reviewed in today's visit: breast self exam    Additional problems addressed during this visit:  1  Encounter for screening mammogram for malignant neoplasm of breast  -     Mammo screening bilateral w 3d & cad; Future; Expected date: 2023    2  Encntr for gyn exam (general) (routine) w/o abn findings  -     IGP, Aptima HPV, Rfx 16/18,45    3  Screening for cervical cancer  -     IGP, Aptima HPV, Rfx 16/18,45    4  Vaginal yeast infection  -     fluconazole (DIFLUCAN) 150 mg tablet; Take 1 tablet (150 mg total) by mouth once for 1 dose        Next visit: 1 yr      CC:  Annual Gynecologic Examination    HPI: Marcia Henley is a 42 y o   who presents for annual gynecologic examination  Patient presents for Gyn exam   Denies having any concerns  Gyn History  Patient's last menstrual period was 2022 (exact date)  Last Pap: 2017 was normal    She  reports being sexually active and has had partner(s) who are male  She reports using the following method of birth control/protection: Male Sterilization  OB History      Past Medical History:  No date: [place] disease ([facility] [address] )  10/05/2020: History of screening mammography      Comment:  [facility] 1   2021: History of screening mammography      Comment:  [facility]ads 1       Past Surgical History:  2020: MAMMO (HISTORICAL)  2003: TONSILLECTOMY     Family History   Problem Relation Age of Onset    Hypothyroidism Mother     Osteoporosis Mother     Atrial fibrillation Father     Breast cancer Neg Hx     Uterine cancer Neg Hx     Ovarian cancer Neg Hx         Social History     Tobacco Use    Smoking status: Never Smoker    Smokeless tobacco: Never Used   Vaping Use    Vaping Use: Never used   Substance Use Topics    Alcohol use: Not Currently    Drug use: Never          Current Outpatient Medications:     Ascorbic Acid (VITAMIN C) 100 MG tablet, Take by mouth, Disp: , Rfl:     Cholecalciferol (VITAMIN D3) 1000 units CAPS, Take by mouth, Disp: , Rfl:     cyanocobalamin (VITAMIN B-12) 100 MCG tablet, Take 100 mcg by mouth daily, Disp: , Rfl:     fludrocortisone (FLORINEF) 0 1 mg tablet, Take 1 tablet (0 1 mg total) by mouth daily, Disp: 90 tablet, Rfl: 3    hydrocortisone (CORTEF) 10 mg tablet, Take 2 tablets in the morning and 1 tablet in the afternoon  , Disp: 120 tablet, Rfl: 7    LORazepam (ATIVAN) 0 5 mg tablet, Take by mouth as needed  , Disp: , Rfl:     Multiple Vitamin (multivitamin) capsule, Take 1 capsule by mouth daily, Disp: , Rfl:     Solu-CORTEF 100 MG SOLR, INJECT 2 ML (100 MG TOTAL) INTO A MUSCLE AS NEEDED (IN CASE OF ADRENAL CRISIS), Disp: 2 mL, Rfl: 0    Synthroid 125 MCG tablet, TAKE 1 TABLET DAILY MONDAY - SATURDAY BRAND SYNTHROID NECESSARY, Disp: 90 tablet, Rfl: 2    Syringe/Needle, Disp, (SYRINGE 3CC/23GX1") 23G X 1" 3 ML MISC, by Does not apply route, Disp: , Rfl:     fluconazole (DIFLUCAN) 150 mg tablet, Take 1 tablet (150 mg total) by mouth once for 1 dose, Disp: 1 tablet, Rfl: 0    SYNTHROID 125 MCG tablet, Take 1 tablet (125 mcg total) by mouth daily (Patient not taking: Reported on 1/21/2020), Disp: 30 tablet, Rfl: 11    She is allergic to amoxicillin       ROS negative except as noted in HPI    Objective:  /74   Ht 5' 0 75" (1 543 m)   Wt 70 1 kg (154 lb 9 6 oz)   LMP 03/29/2022 (Exact Date)   Breastfeeding No   BMI 29 45 kg/m²      Physical Exam  Vitals and nursing note reviewed  HENT:      Head: Normocephalic  Chest:   Breasts: Breasts are symmetrical       Right: Normal  No bleeding, mass, nipple discharge, skin change, tenderness or axillary adenopathy  Left: Normal  No bleeding, mass, nipple discharge, skin change, tenderness or axillary adenopathy  Abdominal:      General: There is no distension  Palpations: Abdomen is soft  There is no mass  Tenderness: There is no abdominal tenderness  There is no rebound  Genitourinary:     General: Normal vulva  Exam position: Lithotomy position  Labia:         Right: No rash, tenderness or lesion  Left: No rash, tenderness or lesion  Urethra: No urethral pain or urethral lesion  Vagina: Normal  No vaginal discharge  Cervix: No discharge, friability, lesion or erythema  Uterus: Normal        Adnexa: Right adnexa normal and left adnexa normal       Rectum: No external hemorrhoid  Comments: Thick cottage cheese discharge in vagina  Musculoskeletal:      Right lower leg: No edema  Left lower leg: No edema  Lymphadenopathy:      Upper Body:      Right upper body: No axillary or pectoral adenopathy  Left upper body: No axillary or pectoral adenopathy  Skin:     General: Skin is warm  Neurological:      Mental Status: She is alert and oriented to person, place, and time  Psychiatric:         Mood and Affect: Mood normal          Behavior: Behavior normal          Thought Content:  Thought content normal

## 2022-04-16 LAB
CYTOLOGIST CVX/VAG CYTO: NORMAL
DX ICD CODE: NORMAL
HPV I/H RISK 4 DNA CVX QL PROBE+SIG AMP: NEGATIVE
OTHER STN SPEC: NORMAL
PATH REPORT.FINAL DX SPEC: NORMAL
SL AMB NOTE:: NORMAL
SL AMB SPECIMEN ADEQUACY: NORMAL
SL AMB TEST METHODOLOGY: NORMAL

## 2022-05-20 DIAGNOSIS — E27.1 ADDISON'S DISEASE (HCC): ICD-10-CM

## 2022-05-20 RX ORDER — HYDROCORTISONE 10 MG/1
TABLET ORAL
Qty: 120 TABLET | Refills: 6 | Status: SHIPPED | OUTPATIENT
Start: 2022-05-20

## 2022-06-15 DIAGNOSIS — E27.1 ADRENAL INSUFFICIENCY (ADDISON'S DISEASE) (HCC): ICD-10-CM

## 2022-06-16 RX ORDER — FLUDROCORTISONE ACETATE 0.1 MG/1
TABLET ORAL
Qty: 90 TABLET | Refills: 3 | Status: SHIPPED | OUTPATIENT
Start: 2022-06-16

## 2022-06-27 DIAGNOSIS — E06.3 HASHIMOTO'S THYROIDITIS: ICD-10-CM

## 2022-06-27 RX ORDER — LEVOTHYROXINE SODIUM 125 UG/1
TABLET ORAL
Qty: 90 TABLET | Refills: 2 | Status: SHIPPED | OUTPATIENT
Start: 2022-06-27

## 2022-08-11 LAB
ALBUMIN SERPL-MCNC: 4.5 G/DL (ref 3.8–4.8)
ALBUMIN/GLOB SERPL: 2.4 {RATIO} (ref 1.2–2.2)
ALP SERPL-CCNC: 38 IU/L (ref 44–121)
ALT SERPL-CCNC: 18 IU/L (ref 0–32)
AST SERPL-CCNC: 18 IU/L (ref 0–40)
BILIRUB SERPL-MCNC: 0.6 MG/DL (ref 0–1.2)
BUN SERPL-MCNC: 8 MG/DL (ref 6–24)
BUN/CREAT SERPL: 10 (ref 9–23)
CALCIUM SERPL-MCNC: 9.3 MG/DL (ref 8.7–10.2)
CHLORIDE SERPL-SCNC: 104 MMOL/L (ref 96–106)
CHOLEST SERPL-MCNC: 188 MG/DL (ref 100–199)
CO2 SERPL-SCNC: 23 MMOL/L (ref 20–29)
CREAT SERPL-MCNC: 0.79 MG/DL (ref 0.57–1)
EGFR: 95 ML/MIN/1.73
GLOBULIN SER-MCNC: 1.9 G/DL (ref 1.5–4.5)
GLUCOSE SERPL-MCNC: 96 MG/DL (ref 65–99)
HDLC SERPL-MCNC: 63 MG/DL
LDLC SERPL CALC-MCNC: 107 MG/DL (ref 0–99)
POTASSIUM SERPL-SCNC: 3.8 MMOL/L (ref 3.5–5.2)
PROT SERPL-MCNC: 6.4 G/DL (ref 6–8.5)
SL AMB VLDL CHOLESTEROL CALC: 18 MG/DL (ref 5–40)
SODIUM SERPL-SCNC: 140 MMOL/L (ref 134–144)
T4 FREE SERPL-MCNC: 1.31 NG/DL (ref 0.82–1.77)
TRIGL SERPL-MCNC: 98 MG/DL (ref 0–149)
TSH SERPL DL<=0.005 MIU/L-ACNC: 1.19 UIU/ML (ref 0.45–4.5)

## 2022-08-16 ENCOUNTER — OFFICE VISIT (OUTPATIENT)
Dept: ENDOCRINOLOGY | Facility: HOSPITAL | Age: 47
End: 2022-08-16
Payer: COMMERCIAL

## 2022-08-16 VITALS
HEIGHT: 61 IN | SYSTOLIC BLOOD PRESSURE: 136 MMHG | WEIGHT: 153.2 LBS | BODY MASS INDEX: 28.92 KG/M2 | DIASTOLIC BLOOD PRESSURE: 84 MMHG | HEART RATE: 103 BPM

## 2022-08-16 DIAGNOSIS — E27.1 ADRENAL INSUFFICIENCY (ADDISON'S DISEASE) (HCC): ICD-10-CM

## 2022-08-16 DIAGNOSIS — E55.9 VITAMIN D DEFICIENCY: ICD-10-CM

## 2022-08-16 DIAGNOSIS — E03.8 HYPOTHYROIDISM DUE TO HASHIMOTO'S THYROIDITIS: ICD-10-CM

## 2022-08-16 DIAGNOSIS — E06.3 HYPOTHYROIDISM DUE TO HASHIMOTO'S THYROIDITIS: ICD-10-CM

## 2022-08-16 DIAGNOSIS — E27.1 ADDISON'S DISEASE (HCC): ICD-10-CM

## 2022-08-16 DIAGNOSIS — E06.3 HASHIMOTO'S THYROIDITIS: Primary | ICD-10-CM

## 2022-08-16 DIAGNOSIS — E27.1 PRIMARY ADRENOCORTICAL INSUFFICIENCY (HCC): ICD-10-CM

## 2022-08-16 PROCEDURE — 99214 OFFICE O/P EST MOD 30 MIN: CPT | Performed by: NURSE PRACTITIONER

## 2022-08-16 NOTE — PATIENT INSTRUCTIONS
Continue Synthroid 125 mcg Monday through Saturday and no tablet on Sunday  Recheck TSH and free T4 prior to next visit  Continue Hydrocortisone with Florinef at current doses  Obtain lab work as prescribed  Contact the office with any problems  As discussed, take 4000 units Vitamin D consistently      [facility] [place]:  Dr Dianne Ceja

## 2022-08-16 NOTE — PROGRESS NOTES
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, hypothyroidism and vitamin-D deficiency  Plan:  1   Jimi's disease: Sodium and potassium are normal   Blood pressure is reasonable in the office today   Continue Hydrocortisone at current doses morning and afternoon  Check comprehensive metabolic panel prior to next office visit      2   Hypothyroidism:  Her most recent TSH and free T4 are normal   She will continue Synthroid 125 mcg Monday through [address] no dose on Sunday   Recheck TSH and free T4  prior to next visit      3   Vitamin-D deficiency: [facility] most recent 25 hydroxy vitamin-D level is 40 1   I have asked her to continue her dose of vitamin-D supplementation at 4000 units daily - consistently  Will continue to monitor over time      4    Hyperlipidemia:  Stable   Discussed the importance of a proper diet and exercise   Will recheck prior to next visit to continue surveillance  CC: Jimi's disease/hypothyroidism follow-up    History of Present Illness     HPI:  42 y  o  female with history of primary autoimmune hypothyroidism, Marengo's disease, vitamin-D deficiency presents for follow-up  He was diagnosed with hypothyroidism over 10 years ago   Approximately 6 years ago, she was evaluated for hypotension, nausea, vomiting, weight loss and 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   Currently, she treats with hydrocortisone 20 mg in the morning and 10 mg in the afternoon  She also treats with fludrocortisone 0 1 mg daily  She has not had any other recent illnesses and does not have any surgeries planned   She complains of some fatigue at times  She does have a emergency bracelet indicating she has adrenal insufficiency and is on chronic steroid replacement  She does report normal menstrual cycles   She denies any history of celiac, B12 deficiency, diabetes       She also has a history of hypothyroidism and takes Synthroid 125 mcg - 6 days per week   Most recent TSH from August 10, 2022 is 1 190 with a free T4 of 1 31       For her vitamin-D deficiency, she supplements with 2000 units of vitamin D3 daily   Her most recent vitamin-D 25 hydroxy level from February 14, 2022 is 40  1       Review of Systems   Constitutional: Positive for fatigue  Negative for chills and fever  HENT: Negative  Negative for trouble swallowing and voice change  Eyes: Negative  Negative for photophobia, pain, discharge, redness, itching and visual disturbance  Respiratory: Negative  Negative for chest tightness and shortness of breath  Cardiovascular: Positive for palpitations (Associated with anxiety)  Negative for chest pain  Gastrointestinal: Negative  Negative for abdominal pain, constipation, diarrhea and vomiting  Endocrine: Negative for cold intolerance, heat intolerance, polydipsia, polyphagia and polyuria  Genitourinary: Negative  Musculoskeletal: Negative  Skin: Negative  Allergic/Immunologic: Negative  Neurological: Negative  Negative for dizziness, syncope, light-headedness and headaches  Hematological: Negative  Psychiatric/Behavioral: The patient is nervous/anxious  All other systems reviewed and are negative  Historical Information   Past Medical History:   Diagnosis Date    [place] disease ([facility] [address] )     History of screening mammography 10/05/2020    [facility] 1     History of screening mammography 12/16/2021    [facility] 1       Past Surgical History:   Procedure Laterality Date    MAMMO (HISTORICAL)  2020    TONSILLECTOMY  2003     Social History   Social History     Substance and Sexual Activity   Alcohol Use Not Currently     Social History     Substance and Sexual Activity   Drug Use Never     Social History     Tobacco Use   Smoking Status Never Smoker   Smokeless Tobacco Never Used     Family History:   Family History   Problem Relation Age of Onset  Hypothyroidism Mother     Osteoporosis Mother     Atrial fibrillation Father     Breast cancer Neg Hx     Uterine cancer Neg Hx     Ovarian cancer Neg Hx        Meds/Allergies   Current Outpatient Medications   Medication Sig Dispense Refill    hydrocortisone (CORTEF) 10 mg tablet Take 2 tablets in the morning and 1 tablet in the afternoon plus extra for adrenal emergency 120 tablet 6    Ascorbic Acid (VITAMIN C) 100 MG tablet Take by mouth      Cholecalciferol (VITAMIN D3) 1000 units CAPS Take by mouth      cyanocobalamin (VITAMIN B-12) 100 MCG tablet Take 100 mcg by mouth daily      fludrocortisone (FLORINEF) 0 1 mg tablet TAKE 1 TABLET BY MOUTH EVERY DAY 90 tablet 3    LORazepam (ATIVAN) 0 5 mg tablet Take by mouth as needed        Multiple Vitamin (multivitamin) capsule Take 1 capsule by mouth daily      Solu-CORTEF 100 MG SOLR INJECT 2 ML (100 MG TOTAL) INTO A MUSCLE AS NEEDED (IN CASE OF ADRENAL CRISIS) 2 mL 0    SYNTHROID 125 MCG tablet Take 1 tablet (125 mcg total) by mouth daily (Patient not taking: No sig reported) 30 tablet 11    Synthroid 125 MCG tablet Take one tablet Monday through Saturday 90 tablet 2    Syringe/Needle, Disp, (SYRINGE 3CC/23GX1") 23G X 1" 3 ML MISC by Does not apply route       No current facility-administered medications for this visit  Allergies   Allergen Reactions    Amoxicillin Other (See Comments)       Objective   Vitals: not currently breastfeeding  Physical Exam  Vitals reviewed  Constitutional:       Appearance: She is well-developed  HENT:      Head: Normocephalic and atraumatic  Eyes:      Conjunctiva/sclera: Conjunctivae normal       Pupils: Pupils are equal, round, and reactive to light  Cardiovascular:      Rate and Rhythm: Normal rate and regular rhythm  Heart sounds: Normal heart sounds  Pulmonary:      Effort: Pulmonary effort is normal       Breath sounds: Normal breath sounds     Abdominal:      General: Bowel sounds are normal       Palpations: Abdomen is soft  Musculoskeletal:         General: Normal range of motion  Cervical back: Normal range of motion and neck supple  Skin:     General: Skin is warm and dry  Neurological:      Mental Status: She is alert and oriented to person, place, and time  Psychiatric:         Behavior: Behavior normal          Thought Content: Thought content normal          Judgment: Judgment normal        Lab Results:   Lab Results   Component Value Date/Time    Free t4 1 31 08/10/2022 09:15 AM    Free t4 1 46 02/14/2022 11:45 AM     Portions of the record may have been created with voice recognition software  Occasional wrong word or "sound a like" substitutions may have occurred due to the inherent limitations of voice recognition software  Read the chart carefully and recognize, using context, where substitutions have occurred

## 2022-12-13 ENCOUNTER — OFFICE VISIT (OUTPATIENT)
Dept: FAMILY MEDICINE CLINIC | Facility: HOSPITAL | Age: 47
End: 2022-12-13

## 2022-12-13 VITALS
HEART RATE: 104 BPM | BODY MASS INDEX: 28.55 KG/M2 | OXYGEN SATURATION: 100 % | TEMPERATURE: 98.1 F | DIASTOLIC BLOOD PRESSURE: 79 MMHG | SYSTOLIC BLOOD PRESSURE: 118 MMHG | HEIGHT: 61 IN | WEIGHT: 151.2 LBS

## 2022-12-13 DIAGNOSIS — R00.2 HEART PALPITATIONS: Primary | ICD-10-CM

## 2022-12-13 DIAGNOSIS — E27.1 ADDISON'S DISEASE (HCC): ICD-10-CM

## 2022-12-13 DIAGNOSIS — Z12.11 SCREEN FOR COLON CANCER: ICD-10-CM

## 2022-12-13 DIAGNOSIS — E03.9 HYPOTHYROIDISM, UNSPECIFIED TYPE: ICD-10-CM

## 2022-12-13 PROBLEM — E55.9 VITAMIN D DEFICIENCY: Status: ACTIVE | Noted: 2018-01-22

## 2022-12-13 NOTE — PROGRESS NOTES
Name: Antonio Matta      : 1975      MRN: [id number]  Encounter Provider: TRICIA Graham  Encounter Date: 2022   Encounter department: [facility]      1  Heart palpitations  Comments:  schedule echo & holter monitor as ordered, f/u with cardiology for further evaluation/treatment  Orders:  -     Ambulatory Referral to Cardiology; Future  -     Holter monitor; Future; Expected date: 2022  -     Echo complete w/ contrast if indicated; Future; Expected date: 2022    2  Screen for colon cancer  -     Cologuard    3  BMI 28 0-28 9,adult  Assessment & Plan:  Healthy diet and regular exercise encouraged      4  [place]'s disease [facility])  Assessment & Plan:  As managed by endocrine  Maintain f/u as planned      5  Hypothyroidism, unspecified type  Assessment & Plan:  As managed by endocrine  Maintain f/u as planned         Flu shot UTD in October   Gyn exam/pap smear UTD  Adacel declined  Agreeable to cologuard - ordered      Subjective      New patient here to establish  Transferring from [facility]  PMH: hypothyroid diagnosed at age 24, lee ann's diagnosed at age 30, anxiety, covid 10/2022  PSH: wisdom teeth, dental surgeries  Social: [employer] [employer] , , 3 children  Preventative: gyn exam/pap smear UTD w/[facility], due for mammo this month - has order to schedule, colon screening - never     Saw Jennifer/Dickson for palpitations @10 years ago  Recently started having heart palpitations again and scheduled w/them in Feb  Notes left sided chest tightness "restriction" feeling  This makes her anxious and then palpitations are worse  Palps and chest pain started after traumatic event on 10/31 (dog attacked)  She also fell while running recently  Review of Systems   Constitutional: Negative  Respiratory: Positive for chest tightness (left sided chest)  Negative for cough and shortness of breath      Cardiovascular: Positive for chest pain and palpitations (happening more often lately, improved since stopping coffee last week, 1 episode up to 30 mins last week, HR in 70s per fit bit)  Gastrointestinal: Positive for abdominal pain ("alot of indigestion")  Neurological: Negative  Psychiatric/Behavioral: The patient is nervous/anxious (due to palpitations)  Current Outpatient Medications on File Prior to Visit   Medication Sig   • Ascorbic Acid (VITAMIN C) 100 MG tablet Take by mouth   • Cholecalciferol (VITAMIN D3) 1000 units CAPS Take by mouth   • cyanocobalamin (VITAMIN B-12) 100 MCG tablet Take 100 mcg by mouth daily   • fludrocortisone (FLORINEF) 0 1 mg tablet TAKE 1 TABLET BY MOUTH EVERY DAY   • hydrocortisone (CORTEF) 10 mg tablet Take 2 tablets in the morning and 1 tablet in the afternoon plus extra for adrenal emergency   • LORazepam (ATIVAN) 0 5 mg tablet Take by mouth as needed     • Multiple Vitamin (multivitamin) capsule Take 1 capsule by mouth daily   • Solu-CORTEF 100 MG SOLR INJECT 2 ML (100 MG TOTAL) INTO A MUSCLE AS NEEDED (IN CASE OF ADRENAL CRISIS)   • Synthroid 125 MCG tablet Take one tablet Monday through Saturday   • Syringe/Needle, Disp, (SYRINGE 3CC/23GX1") 23G X 1" 3 ML MISC by Does not apply route   • [DISCONTINUED] SYNTHROID 125 MCG tablet Take 1 tablet (125 mcg total) by mouth daily (Patient not taking: No sig reported)       Objective     /79 (BP Location: Left arm, Patient Position: Sitting, Cuff Size: Large)   Pulse 104   Temp 98 1 °F (36 7 °C) (Tympanic)   Ht 5' 1" (1 549 m)   Wt 68 6 kg (151 lb 3 2 oz)   SpO2 100%   BMI 28 57 kg/m²       Physical Exam  Vitals reviewed  Constitutional:       General: She is not in acute distress  Appearance: Normal appearance  HENT:      Head: Normocephalic  Eyes:      General: No scleral icterus  Neck:      Thyroid: No thyromegaly  Cardiovascular:      Rate and Rhythm: Normal rate and regular rhythm  Heart sounds:  No murmur heard  Pulmonary:      Effort: Pulmonary effort is normal  No respiratory distress  Breath sounds: Normal breath sounds  Musculoskeletal:      Cervical back: Normal range of motion  Lymphadenopathy:      Cervical: No cervical adenopathy  Skin:     General: Skin is warm and dry  Neurological:      General: No focal deficit present  Mental Status: She is alert and oriented to person, place, and time  Psychiatric:         Attention and Perception: Attention normal          Mood and Affect: Mood is anxious  Affect is tearful  Speech: Speech normal          Behavior: Behavior normal          Thought Content: Thought content normal          Cognition and Memory: Cognition normal          Judgment: Judgment normal         TRICIA Hernandez Ra     BMI Counseling: Body mass index is 28 57 kg/m²  The BMI is above normal  Nutrition recommendations include 3-5 servings of fruits/vegetables daily, consuming healthier snacks, moderation in carbohydrate intake and reducing intake of cholesterol  Exercise recommendations include exercising 3-5 times per week

## 2022-12-13 NOTE — PATIENT INSTRUCTIONS
Weight Management   AMBULATORY CARE:   Why it is important to manage your weight:  Being overweight increases your risk of health conditions such as heart disease, high blood pressure, type 2 diabetes, and certain types of cancer  It can also increase your risk for osteoarthritis, sleep apnea, and other respiratory problems  Aim for a slow, steady weight loss  Even a small amount of weight loss can lower your risk of health problems  Risks of being overweight:  Extra weight can cause many health problems, including the following:  · Diabetes (high blood sugar level)    · High blood pressure or high cholesterol    · Heart disease    · Stroke    · Gallbladder or liver disease    · Cancer of the colon, breast, prostate, liver, or kidney    · Sleep apnea    · Arthritis or gout    Screening  is done to check for health conditions before you have signs or symptoms  If you are 28to 79years old, your blood sugar level may be checked every 3 years for signs of prediabetes or diabetes  Your healthcare provider will check your blood pressure at each visit  High blood pressure can lead to a stroke or other problems  Your provider may check for signs of heart disease, cancer, or other health problems  How to lose weight safely:  A safe and healthy way to lose weight is to eat fewer calories and get regular exercise  · You can lose up about 1 pound a week by decreasing the number of calories you eat by 500 calories each day  You can decrease calories by eating smaller portion sizes or by cutting out high-calorie foods  Read labels to find out how many calories are in the foods you eat  · You can also burn calories with exercise such as walking, swimming, or biking  You will be more likely to keep weight off if you make these changes part of your lifestyle  Exercise at least 30 minutes per day on most days of the week   You can also fit in more physical activity by taking the stairs instead of the elevator or parking farther away from stores  Ask your healthcare provider about the best exercise plan for you  Healthy meal plan for weight management:  A healthy meal plan includes a variety of foods, contains fewer calories, and helps you stay healthy  A healthy meal plan includes the following:     · Eat whole-grain foods more often  A healthy meal plan should contain fiber  Fiber is the part of grains, fruits, and vegetables that is not broken down by your body  Whole-grain foods are healthy and provide extra fiber in your diet  Some examples of whole-grain foods are whole-wheat breads and pastas, oatmeal, brown rice, and bulgur  · Eat a variety of vegetables every day  Include dark, leafy greens such as spinach, kale, cristiano greens, and mustard greens  Eat yellow and orange vegetables such as carrots, sweet potatoes, and winter squash  · Eat a variety of fruits every day  Choose fresh or canned fruit (canned in its own juice or light syrup) instead of juice  Fruit juice has very little or no fiber  · Eat low-fat dairy foods  Drink fat-free (skim) milk or 1% milk  Eat fat-free yogurt and low-fat cottage cheese  Try low-fat cheeses such as mozzarella and other reduced-fat cheeses  · Choose meat and other protein foods that are low in fat  Choose beans or other legumes such as split peas or lentils  Choose fish, skinless poultry (chicken or turkey), or lean cuts of red meat (beef or pork)  Before you cook meat or poultry, cut off any visible fat  · Use less fat and oil  Try baking foods instead of frying them  Add less fat, such as margarine, sour cream, regular salad dressing and mayonnaise to foods  Eat fewer high-fat foods  Some examples of high-fat foods include french fries, doughnuts, ice cream, and cakes  · Eat fewer sweets  Limit foods and drinks that are high in sugar  This includes candy, cookies, regular soda, and sweetened drinks  Ways to decrease calories:   · Eat smaller portions  ? Use a small plate with smaller servings  ? Do not eat second helpings  ? When you eat at a restaurant, ask for a box and place half of your meal in the box before you eat  ? Share an entrée with someone else  · Replace high-calorie snacks with healthy, low-calorie snacks  ? Choose fresh fruit, vegetables, fat-free rice cakes, or air-popped popcorn instead of potato chips, nuts, or chocolate  ? Choose water or calorie-free drinks instead of soda or sweetened drinks  · Do not shop for groceries when you are hungry  You may be more likely to make unhealthy food choices  Take a grocery list of healthy foods and shop after you have eaten  · Eat regular meals  Do not skip meals  Skipping meals can lead to overeating later in the day  This can make it harder for you to lose weight  Eat a healthy snack in place of a meal if you do not have time to eat a regular meal  Talk with a dietitian to help you create a meal plan and schedule that is right for you  Other things to consider as you try to lose weight:   · Be aware of situations that may give you the urge to overeat, such as eating while watching television  Find ways to avoid these situations  For example, read a book, go for a walk, or do crafts  · Meet with a weight loss support group or friends who are also trying to lose weight  This may help you stay motivated to continue working on your weight loss goals  © Copyright [employer] 2022 Information is for End User's use only and may not be sold, redistributed or otherwise used for commercial purposes  All illustrations and images included in CareNotes® are the copyrighted property of A D A M , Inc  or [facility] Sabino Anguiano   The above information is an  only  It is not intended as medical advice for individual conditions or treatments  Talk to your doctor, nurse or pharmacist before following any medical regimen to see if it is safe and effective for you

## 2023-01-12 ENCOUNTER — HOSPITAL ENCOUNTER (OUTPATIENT)
Dept: NON INVASIVE DIAGNOSTICS | Age: 48
Discharge: HOME | End: 2023-01-12

## 2023-01-12 VITALS
HEIGHT: 61 IN | DIASTOLIC BLOOD PRESSURE: 70 MMHG | WEIGHT: 151 LBS | BODY MASS INDEX: 28.51 KG/M2 | SYSTOLIC BLOOD PRESSURE: 118 MMHG | HEART RATE: 84 BPM

## 2023-01-12 DIAGNOSIS — R00.2 HEART PALPITATIONS: ICD-10-CM

## 2023-01-12 LAB
AORTIC ROOT: 2.6 CM
APICAL FOUR CHAMBER EJECTION FRACTION: 55 %
ASCENDING AORTA: 2.7 CM
DOP CALC LVOT AREA: 3.14 CM2
DOP CALC LVOT DIAMETER: 2 CM
E WAVE DECELERATION TIME: 130 MS
FRACTIONAL SHORTENING: 40 % (ref 28–44)
INTERVENTRICULAR SEPTUM IN DIASTOLE (PARASTERNAL SHORT AXIS VIEW): 0.7 CM
INTERVENTRICULAR SEPTUM: 0.7 CM (ref 0.6–1.1)
LEFT ATRIUM AREA SYSTOLE SINGLE PLANE A4C: 15.6 CM2
LEFT ATRIUM SIZE: 3.4 CM
LEFT INTERNAL DIMENSION IN SYSTOLE: 2.5 CM (ref 2.1–4)
LEFT VENTRICULAR INTERNAL DIMENSION IN DIASTOLE: 4.2 CM (ref 3.5–6)
LEFT VENTRICULAR POSTERIOR WALL IN END DIASTOLE: 0.7 CM
LEFT VENTRICULAR STROKE VOLUME: 56 ML
LVSV (TEICH): 56 ML
MV E'TISSUE VEL-SEP: 13 CM/S
MV PEAK A VEL: 0.66 M/S
MV PEAK E VEL: 85 CM/S
MV STENOSIS PRESSURE HALF TIME: 38 MS
MV VALVE AREA P 1/2 METHOD: 5.79 CM2
RIGHT ATRIUM AREA SYSTOLE A4C: 11.7 CM2
RIGHT VENTRICLE ID DIMENSION: 3.4 CM
SL CV LV EF: 65
SL CV PED ECHO LEFT VENTRICLE DIASTOLIC VOLUME (MOD BIPLANE) 2D: 79 ML
SL CV PED ECHO LEFT VENTRICLE SYSTOLIC VOLUME (MOD BIPLANE) 2D: 22 ML
TRICUSPID ANNULAR PLANE SYSTOLIC EXCURSION: 2.5 CM

## 2023-01-19 DIAGNOSIS — E27.1 ADDISON'S DISEASE (HCC): ICD-10-CM

## 2023-01-19 RX ORDER — HYDROCORTISONE 10 MG/1
TABLET ORAL
Qty: 120 TABLET | Refills: 6 | Status: SHIPPED | OUTPATIENT
Start: 2023-01-19

## 2023-01-23 LAB — COLOGUARD RESULT REPORTABLE: NEGATIVE

## 2023-02-10 LAB
25(OH)D3+25(OH)D2 SERPL-MCNC: 30.3 NG/ML (ref 30–100)
ALBUMIN SERPL-MCNC: 4 G/DL (ref 3.8–4.8)
ALBUMIN/GLOB SERPL: 2.1 {RATIO} (ref 1.2–2.2)
ALP SERPL-CCNC: 43 IU/L (ref 44–121)
ALT SERPL-CCNC: 25 IU/L (ref 0–32)
AST SERPL-CCNC: 18 IU/L (ref 0–40)
BILIRUB SERPL-MCNC: 0.7 MG/DL (ref 0–1.2)
BUN SERPL-MCNC: 11 MG/DL (ref 6–24)
BUN/CREAT SERPL: 15 (ref 9–23)
CALCIUM SERPL-MCNC: 9.1 MG/DL (ref 8.7–10.2)
CHLORIDE SERPL-SCNC: 100 MMOL/L (ref 96–106)
CHOLEST SERPL-MCNC: 182 MG/DL (ref 100–199)
CHOLEST/HDLC SERPL: 2.8 RATIO (ref 0–4.4)
CO2 SERPL-SCNC: 24 MMOL/L (ref 20–29)
CREAT SERPL-MCNC: 0.74 MG/DL (ref 0.57–1)
EGFR: 102 ML/MIN/1.73
GLOBULIN SER-MCNC: 1.9 G/DL (ref 1.5–4.5)
GLUCOSE SERPL-MCNC: 98 MG/DL (ref 70–99)
HDLC SERPL-MCNC: 66 MG/DL
LDLC SERPL CALC-MCNC: 102 MG/DL (ref 0–99)
POTASSIUM SERPL-SCNC: 3.6 MMOL/L (ref 3.5–5.2)
PROT SERPL-MCNC: 5.9 G/DL (ref 6–8.5)
SL AMB VLDL CHOLESTEROL CALC: 14 MG/DL (ref 5–40)
SODIUM SERPL-SCNC: 137 MMOL/L (ref 134–144)
T4 FREE SERPL-MCNC: 1.57 NG/DL (ref 0.82–1.77)
TRIGL SERPL-MCNC: 76 MG/DL (ref 0–149)
TSH SERPL DL<=0.005 MIU/L-ACNC: 0.89 UIU/ML (ref 0.45–4.5)

## 2023-02-17 ENCOUNTER — OFFICE VISIT (OUTPATIENT)
Dept: ENDOCRINOLOGY | Facility: HOSPITAL | Age: 48
End: 2023-02-17

## 2023-02-17 VITALS
HEIGHT: 61 IN | WEIGHT: 159.6 LBS | DIASTOLIC BLOOD PRESSURE: 72 MMHG | HEART RATE: 92 BPM | BODY MASS INDEX: 30.13 KG/M2 | SYSTOLIC BLOOD PRESSURE: 114 MMHG

## 2023-02-17 DIAGNOSIS — E27.1 ADRENAL INSUFFICIENCY (ADDISON'S DISEASE) (HCC): ICD-10-CM

## 2023-02-17 DIAGNOSIS — E03.8 HYPOTHYROIDISM DUE TO HASHIMOTO'S THYROIDITIS: ICD-10-CM

## 2023-02-17 DIAGNOSIS — E06.3 HYPOTHYROIDISM DUE TO HASHIMOTO'S THYROIDITIS: ICD-10-CM

## 2023-02-17 DIAGNOSIS — E06.3 HASHIMOTO'S THYROIDITIS: Primary | ICD-10-CM

## 2023-02-17 DIAGNOSIS — E27.1 PRIMARY ADRENOCORTICAL INSUFFICIENCY (HCC): ICD-10-CM

## 2023-02-17 DIAGNOSIS — E27.1 ADDISON'S DISEASE (HCC): ICD-10-CM

## 2023-02-17 DIAGNOSIS — E55.9 VITAMIN D DEFICIENCY: ICD-10-CM

## 2023-02-17 RX ORDER — HYDROCORTISONE SOD SUCCINATE 100 MG
VIAL (EA) INJECTION
Qty: 2 ML | Refills: 0 | Status: SHIPPED | OUTPATIENT
Start: 2023-02-17

## 2023-02-17 NOTE — PROGRESS NOTES
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   Blood pressure is reasonable in the office today   Continue Hydrocortisone at current doses morning and afternoon  Check comprehensive metabolic panel prior to next office visit      2   Hypothyroidism:  Her most recent TSH and free T4 are normal   She will continue Synthroid 125 mcg Monday through [address] no dose on Sunday   Recheck TSH and free T4  prior to next visit      3   Vitamin-D deficiency: Celeste Hunter most recent 25 hydroxy vitamin-D level is 30 3   I have asked her to continue her dose of vitamin-D supplementation at 4000 units daily - consistently   Will continue to monitor over time      4    Hyperlipidemia:  Stable   Discussed the importance of a proper diet and exercise   Will recheck prior to next visit to continue surveillance  CC: [place]'s disease/hypothyroidism follow-up    History of Present Illness     HPI:  40 y  o  female with history of primary autoimmune hypothyroidism, [place]'s disease, vitamin-D deficiency presents for follow-up  He was diagnosed with hypothyroidism over 10 years ago   Approximately 6 years ago, she was evaluated for hypotension, nausea, vomiting, weight loss and discovered to have [place]'s disease   Currently, she treats with hydrocortisone 20 mg in the morning and 10 mg in the afternoon  She also treats with fludrocortisone 0 1 mg daily  She has not had any other recent illnesses and does not have any surgeries planned   She complains of some fatigue at times  She does have a emergency bracelet indicating she has adrenal insufficiency and is on chronic steroid replacement  She does report normal menstrual cycles   She denies any history of celiac, B12 deficiency, diabetes       She also has a history of hypothyroidism and takes Synthroid 125 mcg - 6 days per week   Most recent TSH from February 9, 2023 is 0 893 with a free T4 of 1 57       For her vitamin-D deficiency, she supplements with 2000 units of vitamin D3 daily   Her most recent vitamin-D 25 hydroxy level from February 9, 2023 is 30  3         Review of Systems   Constitutional: Positive for fatigue  Negative for chills and fever  HENT: Negative  Negative for trouble swallowing and voice change  Eyes: Negative  Negative for photophobia, pain, discharge, redness, itching and visual disturbance  Respiratory: Negative  Negative for chest tightness and shortness of breath  Cardiovascular: Positive for palpitations (at times)  Negative for chest pain  Gastrointestinal: Negative  Negative for abdominal pain, constipation, diarrhea and vomiting  Endocrine: Negative for cold intolerance, heat intolerance, polydipsia, polyphagia and polyuria  Genitourinary: Negative  Musculoskeletal: Negative  Skin: Negative  Allergic/Immunologic: Negative  Neurological: Negative  Negative for dizziness, syncope, light-headedness and headaches  Hematological: Negative  Psychiatric/Behavioral: Negative  All other systems reviewed and are negative  Historical Information   Past Medical History:   Diagnosis Date   • [place] disease ([facility] [address] )    • History of screening mammography 10/05/2020    Bi-Rads 1    • History of screening mammography 12/16/2021    Bi-Rads 1       Past Surgical History:   Procedure Laterality Date   • MAMMO (HISTORICAL)  2020   • TONSILLECTOMY  2003     Social History   Social History     Substance and Sexual Activity   Alcohol Use Not Currently     Social History     Substance and Sexual Activity   Drug Use Never     Social History     Tobacco Use   Smoking Status Never   Smokeless Tobacco Never     Family History:   Family History   Problem Relation Age of Onset   • Heart disease Mother    • Coronary artery disease Mother         ablation   • Hypothyroidism Mother    • Osteoporosis Mother    • Coronary artery disease Father         ETOH induced cardiomyopathy, defibrillator   • Atrial fibrillation Father    • No Known Problems Son    • No Known Problems Daughter    • No Known Problems Daughter    • Breast cancer Neg Hx    • Uterine cancer Neg Hx    • Ovarian cancer Neg Hx        Meds/Allergies   Current Outpatient Medications   Medication Sig Dispense Refill   • Ascorbic Acid (VITAMIN C) 100 MG tablet Take by mouth     • Cholecalciferol (VITAMIN D3) 1000 units CAPS Take by mouth     • cyanocobalamin (VITAMIN B-12) 100 MCG tablet Take 100 mcg by mouth daily     • fludrocortisone (FLORINEF) 0 1 mg tablet TAKE 1 TABLET BY MOUTH EVERY DAY 90 tablet 3   • hydrocortisone (CORTEF) 10 mg tablet TAKE 2 TABLETS IN THE MORNING AND 1 TABLET IN THE AFTERNOON PLUS EXTRA FOR ADRENAL EMERGENCY 120 tablet 6   • LORazepam (ATIVAN) 0 5 mg tablet Take by mouth as needed       • Multiple Vitamin (multivitamin) capsule Take 1 capsule by mouth daily     • Solu-CORTEF 100 MG SOLR INJECT 2 ML (100 MG TOTAL) INTO A MUSCLE AS NEEDED (IN CASE OF ADRENAL CRISIS) 2 mL 0   • Synthroid 125 MCG tablet Take one tablet Monday through Saturday 90 tablet 2   • Syringe/Needle, Disp, (SYRINGE 3CC/23GX1") 23G X 1" 3 ML MISC by Does not apply route       No current facility-administered medications for this visit  Allergies   Allergen Reactions   • Amoxicillin Other (See Comments)       Objective   Vitals: not currently breastfeeding  Physical Exam  Vitals reviewed  Constitutional:       Appearance: She is well-developed  HENT:      Head: Normocephalic and atraumatic  Eyes:      Conjunctiva/sclera: Conjunctivae normal       Pupils: Pupils are equal, round, and reactive to light  Cardiovascular:      Rate and Rhythm: Normal rate and regular rhythm  Heart sounds: Normal heart sounds     Pulmonary:      Effort: Pulmonary effort is normal  Breath sounds: Normal breath sounds  Abdominal:      General: Bowel sounds are normal       Palpations: Abdomen is soft  Musculoskeletal:         General: Normal range of motion  Cervical back: Normal range of motion and neck supple  Skin:     General: Skin is warm and dry  Neurological:      Mental Status: She is alert and oriented to person, place, and time  Psychiatric:         Behavior: Behavior normal          Thought Content: Thought content normal          Judgment: Judgment normal        Lab Results:   Lab Results   Component Value Date/Time    Free t4 1 57 02/09/2023 07:06 AM    Free t4 1 31 08/10/2022 09:15 AM     Portions of the record may have been created with voice recognition software  Occasional wrong word or "sound a like" substitutions may have occurred due to the inherent limitations of voice recognition software  Read the chart carefully and recognize, using context, where substitutions have occurred

## 2023-02-21 DIAGNOSIS — Z12.31 ENCOUNTER FOR SCREENING MAMMOGRAM FOR MALIGNANT NEOPLASM OF BREAST: ICD-10-CM

## 2023-03-10 ENCOUNTER — OFFICE VISIT (OUTPATIENT)
Dept: FAMILY MEDICINE CLINIC | Facility: HOSPITAL | Age: 48
End: 2023-03-10

## 2023-03-10 VITALS
DIASTOLIC BLOOD PRESSURE: 68 MMHG | WEIGHT: 158.8 LBS | SYSTOLIC BLOOD PRESSURE: 122 MMHG | HEART RATE: 85 BPM | HEIGHT: 61 IN | BODY MASS INDEX: 29.98 KG/M2 | TEMPERATURE: 98.4 F

## 2023-03-10 DIAGNOSIS — E27.1 ADDISON'S DISEASE (HCC): ICD-10-CM

## 2023-03-10 DIAGNOSIS — R00.2 HEART PALPITATIONS: Primary | ICD-10-CM

## 2023-03-10 NOTE — PROGRESS NOTES
Name: Doroteo Mart      : 1975      MRN: [id number]  Encounter Provider: TRICIA Epperson  Encounter Date: 3/10/2023   Encounter department: [facility] [address]  Heart palpitations  Comments:  improved/resolved w/cessation of caffeine, s/p normal echo and holter monitor    2  Jimi's disease [facility])  Assessment & Plan:  Endocrine follow up UTD      return in 6 months to update annual PE  Gyn exam/pap smear/mammo/cologuard all UTD       Subjective      Here to review results of recent testing  Had echo and holter a few months ago for eval of palpitations - both were normal  Saw endocrine recently for jimi's disease and had blood work - all stable  Saw gyn recently - mammo and pelvic US were both normal  cologuard completed and normal   She denies concerns or questions today  Palpitations have improved since she stopped drinking caffeine  Review of Systems   Constitutional: Negative  Respiratory: Negative  Cardiovascular: Negative  Negative for palpitations (improved off caffeine)         Current Outpatient Medications on File Prior to Visit   Medication Sig   • Ascorbic Acid (VITAMIN C) 100 MG tablet Take by mouth   • Cholecalciferol (VITAMIN D3) 1000 units CAPS Take by mouth   • cyanocobalamin (VITAMIN B-12) 100 MCG tablet Take 100 mcg by mouth daily   • fludrocortisone (FLORINEF) 0 1 mg tablet TAKE 1 TABLET BY MOUTH EVERY DAY   • hydrocortisone (CORTEF) 10 mg tablet TAKE 2 TABLETS IN THE MORNING AND 1 TABLET IN THE AFTERNOON PLUS EXTRA FOR ADRENAL EMERGENCY   • hydrocortisone (Solu-CORTEF) 100 mg SOLR Inject in the event of adrenal crisis   • LORazepam (ATIVAN) 0 5 mg tablet Take by mouth as needed     • Multiple Vitamin (multivitamin) capsule Take 1 capsule by mouth daily   • Synthroid 125 MCG tablet Take one tablet Monday through Saturday   • Syringe/Needle, Disp, (SYRINGE 3CC/23GX1") 23G X 1" 3 ML MISC by Does not apply route Objective     /68   Pulse 85   Temp 98 4 °F (36 9 °C)   Ht 5' 1" (1 549 m)   Wt 72 kg (158 lb 12 8 oz)   BMI 30 00 kg/m²       Physical Exam  Vitals reviewed  Constitutional:       General: She is not in acute distress  Appearance: Normal appearance  HENT:      Head: Normocephalic  Eyes:      General: No scleral icterus  Neck:      Thyroid: No thyromegaly  Vascular: No carotid bruit  Cardiovascular:      Rate and Rhythm: Normal rate and regular rhythm  Heart sounds: No murmur heard  Pulmonary:      Effort: Pulmonary effort is normal  No respiratory distress  Breath sounds: Normal breath sounds  Musculoskeletal:      Cervical back: Normal range of motion  Right lower leg: No edema  Left lower leg: No edema  Lymphadenopathy:      Cervical: No cervical adenopathy  Skin:     General: Skin is warm and dry  Neurological:      General: No focal deficit present  Mental Status: She is alert and oriented to person, place, and time     Psychiatric:         Mood and Affect: Mood normal          Behavior: Behavior normal           TRICIA Franco

## 2023-03-22 DIAGNOSIS — E06.3 HASHIMOTO'S THYROIDITIS: ICD-10-CM

## 2023-03-22 RX ORDER — LEVOTHYROXINE SODIUM 125 MCG
TABLET ORAL
Qty: 90 TABLET | Refills: 2 | Status: SHIPPED | OUTPATIENT
Start: 2023-03-22

## 2023-06-19 DIAGNOSIS — E27.1 ADRENAL INSUFFICIENCY (ADDISON'S DISEASE) (HCC): ICD-10-CM

## 2023-06-20 RX ORDER — FLUDROCORTISONE ACETATE 0.1 MG/1
TABLET ORAL
Qty: 90 TABLET | Refills: 3 | Status: SHIPPED | OUTPATIENT
Start: 2023-06-20

## 2023-07-14 ENCOUNTER — OFFICE VISIT (OUTPATIENT)
Dept: FAMILY MEDICINE CLINIC | Facility: HOSPITAL | Age: 48
End: 2023-07-14
Payer: COMMERCIAL

## 2023-07-14 VITALS
SYSTOLIC BLOOD PRESSURE: 124 MMHG | OXYGEN SATURATION: 98 % | TEMPERATURE: 97.7 F | WEIGHT: 157 LBS | DIASTOLIC BLOOD PRESSURE: 78 MMHG | HEART RATE: 87 BPM | HEIGHT: 61 IN | BODY MASS INDEX: 29.64 KG/M2

## 2023-07-14 DIAGNOSIS — A69.20 ERYTHEMA MIGRANS (LYME DISEASE): Primary | ICD-10-CM

## 2023-07-14 DIAGNOSIS — B37.2 CUTANEOUS CANDIDIASIS: ICD-10-CM

## 2023-07-14 DIAGNOSIS — H92.03 OTALGIA OF BOTH EARS: ICD-10-CM

## 2023-07-14 PROCEDURE — 99214 OFFICE O/P EST MOD 30 MIN: CPT | Performed by: NURSE PRACTITIONER

## 2023-07-14 RX ORDER — DOXYCYCLINE 100 MG/1
100 TABLET ORAL 2 TIMES DAILY
Qty: 63 TABLET | Refills: 0 | Status: SHIPPED | OUTPATIENT
Start: 2023-07-14 | End: 2023-08-04

## 2023-07-29 LAB
ALBUMIN SERPL-MCNC: 4.4 G/DL (ref 3.9–4.9)
ALBUMIN/GLOB SERPL: 2.8 {RATIO} (ref 1.2–2.2)
ALP SERPL-CCNC: 35 IU/L (ref 44–121)
ALT SERPL-CCNC: 18 IU/L (ref 0–32)
AST SERPL-CCNC: 19 IU/L (ref 0–40)
BASOPHILS # BLD AUTO: 0.1 X10E3/UL (ref 0–0.2)
BASOPHILS NFR BLD AUTO: 1 %
BILIRUB SERPL-MCNC: 0.7 MG/DL (ref 0–1.2)
BUN SERPL-MCNC: 12 MG/DL (ref 6–24)
BUN/CREAT SERPL: 15 (ref 9–23)
CALCIUM SERPL-MCNC: 9.4 MG/DL (ref 8.7–10.2)
CHLORIDE SERPL-SCNC: 105 MMOL/L (ref 96–106)
CHOLEST SERPL-MCNC: 200 MG/DL (ref 100–199)
CHOLEST/HDLC SERPL: 2.7 RATIO (ref 0–4.4)
CO2 SERPL-SCNC: 22 MMOL/L (ref 20–29)
CREAT SERPL-MCNC: 0.8 MG/DL (ref 0.57–1)
EGFR: 93 ML/MIN/1.73
EOSINOPHIL # BLD AUTO: 0.1 X10E3/UL (ref 0–0.4)
EOSINOPHIL NFR BLD AUTO: 2 %
ERYTHROCYTE [DISTWIDTH] IN BLOOD BY AUTOMATED COUNT: 13.6 % (ref 11.7–15.4)
EST. AVERAGE GLUCOSE BLD GHB EST-MCNC: 105 MG/DL
GLOBULIN SER-MCNC: 1.6 G/DL (ref 1.5–4.5)
GLUCOSE SERPL-MCNC: 87 MG/DL (ref 70–99)
HBA1C MFR BLD: 5.3 % (ref 4.8–5.6)
HCT VFR BLD AUTO: 41.4 % (ref 34–46.6)
HDLC SERPL-MCNC: 74 MG/DL
HGB BLD-MCNC: 13.8 G/DL (ref 11.1–15.9)
IMM GRANULOCYTES # BLD: 0 X10E3/UL (ref 0–0.1)
IMM GRANULOCYTES NFR BLD: 0 %
LDLC SERPL CALC-MCNC: 111 MG/DL (ref 0–99)
LYMPHOCYTES # BLD AUTO: 3.2 X10E3/UL (ref 0.7–3.1)
LYMPHOCYTES NFR BLD AUTO: 45 %
MCH RBC QN AUTO: 30.3 PG (ref 26.6–33)
MCHC RBC AUTO-ENTMCNC: 33.3 G/DL (ref 31.5–35.7)
MCV RBC AUTO: 91 FL (ref 79–97)
MONOCYTES # BLD AUTO: 0.6 X10E3/UL (ref 0.1–0.9)
MONOCYTES NFR BLD AUTO: 9 %
NEUTROPHILS # BLD AUTO: 3 X10E3/UL (ref 1.4–7)
NEUTROPHILS NFR BLD AUTO: 43 %
PLATELET # BLD AUTO: 203 X10E3/UL (ref 150–450)
POTASSIUM SERPL-SCNC: 3.8 MMOL/L (ref 3.5–5.2)
PROT SERPL-MCNC: 6 G/DL (ref 6–8.5)
RBC # BLD AUTO: 4.55 X10E6/UL (ref 3.77–5.28)
SL AMB VLDL CHOLESTEROL CALC: 15 MG/DL (ref 5–40)
SODIUM SERPL-SCNC: 140 MMOL/L (ref 134–144)
T4 FREE SERPL-MCNC: 1.45 NG/DL (ref 0.82–1.77)
TRIGL SERPL-MCNC: 82 MG/DL (ref 0–149)
TSH SERPL DL<=0.005 MIU/L-ACNC: 1.27 UIU/ML (ref 0.45–4.5)
WBC # BLD AUTO: 7 X10E3/UL (ref 3.4–10.8)

## 2023-08-08 ENCOUNTER — OFFICE VISIT (OUTPATIENT)
Dept: ENDOCRINOLOGY | Facility: HOSPITAL | Age: 48
End: 2023-08-08
Payer: COMMERCIAL

## 2023-08-08 VITALS
WEIGHT: 158 LBS | HEART RATE: 87 BPM | OXYGEN SATURATION: 99 % | HEIGHT: 61 IN | DIASTOLIC BLOOD PRESSURE: 82 MMHG | BODY MASS INDEX: 29.83 KG/M2 | SYSTOLIC BLOOD PRESSURE: 118 MMHG

## 2023-08-08 DIAGNOSIS — E06.3 HYPOTHYROIDISM DUE TO HASHIMOTO'S THYROIDITIS: ICD-10-CM

## 2023-08-08 DIAGNOSIS — E03.8 HYPOTHYROIDISM DUE TO HASHIMOTO'S THYROIDITIS: ICD-10-CM

## 2023-08-08 DIAGNOSIS — E27.1 PRIMARY ADRENOCORTICAL INSUFFICIENCY (HCC): ICD-10-CM

## 2023-08-08 DIAGNOSIS — E27.1 ADRENAL INSUFFICIENCY (ADDISON'S DISEASE) (HCC): ICD-10-CM

## 2023-08-08 DIAGNOSIS — E06.3 HASHIMOTO'S THYROIDITIS: Primary | ICD-10-CM

## 2023-08-08 DIAGNOSIS — E55.9 VITAMIN D DEFICIENCY: ICD-10-CM

## 2023-08-08 DIAGNOSIS — E27.1 ADDISON'S DISEASE (HCC): ICD-10-CM

## 2023-08-08 PROCEDURE — 99214 OFFICE O/P EST MOD 30 MIN: CPT | Performed by: NURSE PRACTITIONER

## 2023-08-08 NOTE — PATIENT INSTRUCTIONS
Continue Synthroid 125 mcg Monday through Saturday and no tablet on Sunday. Recheck TSH and free T4 prior to next visit. Continue Hydrocortisone with Florinef at current doses. Obtain lab work as prescribed. Contact the office with any problems. Continue 4000 units Vitamin D consistently.

## 2023-08-08 NOTE — PROGRESS NOTES
[address] [facility] 41 y.o. female MRN: [id number]    Encounter: [id number]      Assessment/Plan     Assessment: This is a 41y.o.-year-old female with [place]'s disease, hypothyroidism and vitamin-D deficiency.       Plan:  1.  [place]'s disease: Sodium and potassium are normal.  Blood pressure is reasonable in the office today.  Continue Hydrocortisone at current doses morning and afternoon. Check comprehensive metabolic panel prior to next office visit.     2.  Hypothyroidism:  Her most recent TSH and free T4 are normal.  She will continue Synthroid 125 mcg Monday through [facility] [address]IGER no dose on Sunday. Recheck TSH and free T4  prior to next visit.     3.  Vitamin-D deficiency:  I have asked her to continue her dose of vitamin-D supplementation at 4000 units daily - consistently.  Will continue to monitor over time.     4.   Hyperlipidemia:  Stable.  Discussed the importance of a proper diet and exercise.  Will recheck prior to next visit to continue surveillance.       CC: [place]'s disease/Hypothyroidism follow-up    History of Present Illness     HPI:  47 y. o. female with history of primary autoimmune hypothyroidism, [place]'s disease, vitamin-D deficiency presents for follow-up. He was diagnosed with hypothyroidism over 10 years ago.  Approximately 6 years ago, she was evaluated for hypotension, nausea, vomiting, weight loss and discovered to have [place]'s disease.  Currently, she treats with hydrocortisone 20 mg in the morning and 10 mg in the afternoon. She also treats with fludrocortisone 0.1 mg daily. She has not had any other recent illnesses and does not have any surgeries planned.  She complains of some fatigue at times. She does have a emergency bracelet indicating she has adrenal insufficiency and is on chronic steroid replacement. She does report normal menstrual cycles.  She denies any history of celiac, B12 deficiency, diabetes.      She also has a history of hypothyroidism and takes Synthroid 125 mcg - 6 days per week.  Most recent TSH from July 28, 2023 is 1.270 with a free T4 of 1.45.      For her vitamin-D deficiency, she supplements with 2000 units of vitamin D3 daily.     Review of Systems   Constitutional: Positive for fatigue. Negative for chills and fever. HENT: Negative. Negative for trouble swallowing and voice change. Eyes: Negative. Negative for photophobia, pain, discharge, redness, itching and visual disturbance. Respiratory: Negative. Negative for chest tightness and shortness of breath. Cardiovascular: Negative. Negative for chest pain. Gastrointestinal: Negative. Negative for abdominal pain, constipation, diarrhea and vomiting. Endocrine: Negative for cold intolerance, heat intolerance, polydipsia, polyphagia and polyuria. Genitourinary: Negative. Musculoskeletal: Negative. Skin: Negative. Allergic/Immunologic: Negative. Neurological: Negative. Negative for dizziness, syncope, light-headedness and headaches. Hematological: Negative. Psychiatric/Behavioral: Negative. All other systems reviewed and are negative. Historical Information   Past Medical History:   Diagnosis Date   • [place] disease ([address])    • Anxiety    • Disease of thyroid gland    • History of screening mammography 10/05/2020    Bi-Rads 1.   • History of screening mammography 12/16/2021    Bi-Rads 1.      Past Surgical History:   Procedure Laterality Date   • MAMMO (HISTORICAL)  2020   • TONSILLECTOMY  2003     Social History   Social History     Substance and Sexual Activity   Alcohol Use Not Currently     Social History     Substance and Sexual Activity   Drug Use Never     Social History     Tobacco Use   Smoking Status Never   Smokeless Tobacco Never     Family History:   Family History   Problem Relation Age of Onset   • Heart disease Mother    • Coronary artery disease Mother         ablation   • Hypothyroidism Mother    • Osteoporosis Mother    • Coronary artery disease Father ETOH induced cardiomyopathy, defibrillator   • Atrial fibrillation Father    • No Known Problems Son    • No Known Problems Daughter    • No Known Problems Daughter    • Breast cancer Neg Hx    • Uterine cancer Neg Hx    • Ovarian cancer Neg Hx        Meds/Allergies   Current Outpatient Medications   Medication Sig Dispense Refill   • Ascorbic Acid (VITAMIN C) 100 MG tablet Take by mouth     • Cholecalciferol (VITAMIN D3) 1000 units CAPS Take by mouth     • cyanocobalamin (VITAMIN B-12) 100 MCG tablet Take 100 mcg by mouth daily     • fludrocortisone (FLORINEF) 0.1 mg tablet TAKE 1 TABLET BY MOUTH EVERY DAY 90 tablet 3   • hydrocortisone (CORTEF) 10 mg tablet TAKE 2 TABLETS IN THE MORNING AND 1 TABLET IN THE AFTERNOON PLUS EXTRA FOR ADRENAL EMERGENCY 120 tablet 6   • LORazepam (ATIVAN) 0.5 mg tablet Take by mouth as needed       • Multiple Vitamin (multivitamin) capsule Take 1 capsule by mouth daily     • nystatin-triamcinolone (MYCOLOG-II) cream Apply topically 2 (two) times a day 30 g 0   • Solu-CORTEF 100 MG SOLR INJECT IN THE EVENT OF ADRENAL CRISIS 2 mL 0   • Synthroid 125 MCG tablet TAKE 1 TABLET BY MOUTH MONDAY THROUGH SATURDAY 90 tablet 2   • Syringe/Needle, Disp, (SYRINGE 3CC/23GX1") 23G X 1" 3 ML MISC by Does not apply route       No current facility-administered medications for this visit. Allergies   Allergen Reactions   • Amoxicillin Other (See Comments)       Objective   Vitals: not currently breastfeeding. Physical Exam  Vitals reviewed. Constitutional:       Appearance: She is well-developed. HENT:      Head: Normocephalic and atraumatic. Eyes:      Conjunctiva/sclera: Conjunctivae normal.      Pupils: Pupils are equal, round, and reactive to light. Cardiovascular:      Rate and Rhythm: Normal rate and regular rhythm. Heart sounds: Normal heart sounds. Pulmonary:      Effort: Pulmonary effort is normal.      Breath sounds: Normal breath sounds.    Abdominal:      General: Bowel sounds are normal.      Palpations: Abdomen is soft. Musculoskeletal:         General: Normal range of motion. Cervical back: Normal range of motion and neck supple. Skin:     General: Skin is warm and dry. Neurological:      Mental Status: She is alert and oriented to person, place, and time. Psychiatric:         Behavior: Behavior normal.         Thought Content: Thought content normal.         Judgment: Judgment normal.       Lab Results:   Lab Results   Component Value Date/Time    Free t4 1.45 07/28/2023 08:16 AM    Free t4 1.57 02/09/2023 07:06 AM    Free t4 1.31 08/10/2022 09:15 AM     Portions of the record may have been created with voice recognition software. Occasional wrong word or "sound a like" substitutions may have occurred due to the inherent limitations of voice recognition software. Read the chart carefully and recognize, using context, where substitutions have occurred.

## 2023-09-04 DIAGNOSIS — E27.1 ADDISON'S DISEASE (HCC): ICD-10-CM

## 2023-09-05 RX ORDER — HYDROCORTISONE 10 MG/1
TABLET ORAL
Qty: 120 TABLET | Refills: 6 | Status: SHIPPED | OUTPATIENT
Start: 2023-09-05

## 2023-10-30 ENCOUNTER — OFFICE VISIT (OUTPATIENT)
Dept: FAMILY MEDICINE CLINIC | Facility: HOSPITAL | Age: 48
End: 2023-10-30
Payer: COMMERCIAL

## 2023-10-30 VITALS
BODY MASS INDEX: 29.23 KG/M2 | SYSTOLIC BLOOD PRESSURE: 122 MMHG | WEIGHT: 154.8 LBS | DIASTOLIC BLOOD PRESSURE: 80 MMHG | HEART RATE: 81 BPM | TEMPERATURE: 98 F | HEIGHT: 61 IN

## 2023-10-30 DIAGNOSIS — R10.10 UPPER ABDOMINAL PAIN: Primary | ICD-10-CM

## 2023-10-30 PROCEDURE — 99214 OFFICE O/P EST MOD 30 MIN: CPT | Performed by: NURSE PRACTITIONER

## 2023-10-30 RX ORDER — FAMOTIDINE 40 MG/1
40 TABLET, FILM COATED ORAL DAILY
Qty: 30 TABLET | Refills: 0 | Status: SHIPPED | OUTPATIENT
Start: 2023-10-30

## 2023-10-30 NOTE — PROGRESS NOTES
Name: Chapin Stone      : 1975      MRN: [id number]  Encounter Provider: TRICIA Jennings  Encounter Date: 10/30/2023   Encounter department: [address]     1. Upper abdominal pain  -     famotidine (PEPCID) 40 MG tablet; Take 1 tablet (40 mg total) by mouth daily  -     US abdomen complete; Future; Expected date: 10/30/2023  -     CBC and differential; Future  -     Comprehensive metabolic panel; Future  -     C-reactive protein; Future  -     Celiac Disease Antibody Profile; Future  -     Lipase; Future  -     CBC and differential  -     Comprehensive metabolic panel  -     C-reactive protein  -     Celiac Disease Antibody Profile  -     Lipase      Evaluate symptoms further w/labs and abdomen US as ordered   Start daily pepcid as rx'd  Will determine further plan pending results of above - may need surgical vs GI consult if sx's persist       Subjective        Has been having pain in abdomen for 2 weeks. Started in upper abdomen at stomach, and has also been in RUQ, and right lower abdomen. Notes pain more when stomach is empty (ie, 2 hours after eating). Had noted indigestion and feeling gassy. Has been scared to eat so feels pain has improved. Was also constipated "out of nowhere" but this has improved with diet changes. Last ETOH 4 years ago. Head cold early October has resolved. Had 3 marks on left side of stomach on 10/12 for a few days - felt tingly and they resolved on own. She was treated for shingles a couple times previously for same rash. Review of Systems   Constitutional:  Positive for appetite change (hesitant to eat) and fever (felt feverish, temp 97 3 days ago). Negative for unexpected weight change. HENT:  Positive for trouble swallowing ("food gets stuck in there", for a really long time).     Gastrointestinal:  Positive for abdominal pain, constipation (initially, now improved) and nausea (taking TUMS w/benefit when she feels acid reflux). Negative for blood in stool and diarrhea. Genitourinary:  Negative for dysuria and frequency. Current Outpatient Medications on File Prior to Visit   Medication Sig    Ascorbic Acid (VITAMIN C) 100 MG tablet Take by mouth    Cholecalciferol (VITAMIN D3) 1000 units CAPS Take by mouth    cyanocobalamin (VITAMIN B-12) 100 MCG tablet Take 100 mcg by mouth daily    fludrocortisone (FLORINEF) 0.1 mg tablet TAKE 1 TABLET BY MOUTH EVERY DAY    hydrocortisone (CORTEF) 10 mg tablet TAKE 2 TABLETS IN THE MORNING AND 1 TABLET IN THE AFTERNOON PLUS EXTRA FOR ADRENAL EMERGENCY    LORazepam (ATIVAN) 0.5 mg tablet Take by mouth as needed      Magnesium 100 MG TABS Take by mouth    Multiple Vitamin (multivitamin) capsule Take 1 capsule by mouth daily    nystatin-triamcinolone (MYCOLOG-II) cream Apply topically 2 (two) times a day    Solu-CORTEF 100 MG SOLR INJECT IN THE EVENT OF ADRENAL CRISIS    Synthroid 125 MCG tablet TAKE 1 TABLET BY MOUTH MONDAY THROUGH SATURDAY    Syringe/Needle, Disp, (SYRINGE 3CC/23GX1") 23G X 1" 3 ML MISC by Does not apply route       Objective     /80   Pulse 81   Temp 98 °F (36.7 °C)   Ht 5' 1" (1.549 m)   Wt 70.2 kg (154 lb 12.8 oz)   BMI 29.25 kg/m²       Physical Exam  Vitals reviewed. Constitutional:       General: She is not in acute distress. Appearance: She is well-developed. HENT:      Head: Normocephalic. Eyes:      General: No scleral icterus. Cardiovascular:      Rate and Rhythm: Normal rate and regular rhythm. Heart sounds: No murmur heard. Pulmonary:      Effort: Pulmonary effort is normal. No respiratory distress. Breath sounds: Normal breath sounds. Abdominal:      General: Bowel sounds are normal. There is no distension. Palpations: Abdomen is soft. There is no hepatomegaly or splenomegaly. Tenderness: There is abdominal tenderness in the epigastric area.  There is no right CVA tenderness, left CVA tenderness, guarding or rebound. Negative signs include Cosme's sign. Skin:     General: Skin is warm and dry. Neurological:      General: No focal deficit present. Mental Status: She is alert and oriented to person, place, and time. Psychiatric:         Mood and Affect: Mood is anxious.          TRICIA Jacobs

## 2023-11-19 ENCOUNTER — HOSPITAL ENCOUNTER (OUTPATIENT)
Dept: ULTRASOUND IMAGING | Facility: HOSPITAL | Age: 48
Discharge: HOME | End: 2023-11-19
Payer: COMMERCIAL

## 2023-11-19 DIAGNOSIS — R10.10 UPPER ABDOMINAL PAIN: ICD-10-CM

## 2023-11-19 PROCEDURE — 76700 US EXAM ABDOM COMPLETE: CPT

## 2023-11-21 LAB
ALBUMIN SERPL-MCNC: 4 G/DL (ref 3.9–4.9)
ALBUMIN/GLOB SERPL: 2.2 {RATIO} (ref 1.2–2.2)
ALP SERPL-CCNC: 36 IU/L (ref 44–121)
ALT SERPL-CCNC: 12 IU/L (ref 0–32)
AST SERPL-CCNC: 16 IU/L (ref 0–40)
BASOPHILS # BLD AUTO: 0.1 X10E3/UL (ref 0–0.2)
BASOPHILS NFR BLD AUTO: 1 %
BILIRUB SERPL-MCNC: 0.3 MG/DL (ref 0–1.2)
BUN SERPL-MCNC: 10 MG/DL (ref 6–24)
BUN/CREAT SERPL: 12 (ref 9–23)
CALCIUM SERPL-MCNC: 9 MG/DL (ref 8.7–10.2)
CHLORIDE SERPL-SCNC: 102 MMOL/L (ref 96–106)
CO2 SERPL-SCNC: 24 MMOL/L (ref 20–29)
CREAT SERPL-MCNC: 0.85 MG/DL (ref 0.57–1)
CRP SERPL-MCNC: <1 MG/L (ref 0–10)
EGFR: 86 ML/MIN/1.73
ENDOMYSIUM IGA SER QL: NEGATIVE
EOSINOPHIL # BLD AUTO: 0.1 X10E3/UL (ref 0–0.4)
EOSINOPHIL NFR BLD AUTO: 2 %
ERYTHROCYTE [DISTWIDTH] IN BLOOD BY AUTOMATED COUNT: 12.6 % (ref 11.7–15.4)
GLIADIN PEPTIDE IGA SER-ACNC: 7 UNITS (ref 0–19)
GLIADIN PEPTIDE IGG SER-ACNC: 3 UNITS (ref 0–19)
GLOBULIN SER-MCNC: 1.8 G/DL (ref 1.5–4.5)
GLUCOSE SERPL-MCNC: 99 MG/DL (ref 70–99)
HCT VFR BLD AUTO: 39.9 % (ref 34–46.6)
HGB BLD-MCNC: 13 G/DL (ref 11.1–15.9)
IGA SERPL-MCNC: 190 MG/DL (ref 87–352)
IMM GRANULOCYTES # BLD: 0 X10E3/UL (ref 0–0.1)
IMM GRANULOCYTES NFR BLD: 0 %
LIPASE SERPL-CCNC: 55 U/L (ref 14–72)
LYMPHOCYTES # BLD AUTO: 3 X10E3/UL (ref 0.7–3.1)
LYMPHOCYTES NFR BLD AUTO: 45 %
MCH RBC QN AUTO: 30.6 PG (ref 26.6–33)
MCHC RBC AUTO-ENTMCNC: 32.6 G/DL (ref 31.5–35.7)
MCV RBC AUTO: 94 FL (ref 79–97)
MONOCYTES # BLD AUTO: 0.6 X10E3/UL (ref 0.1–0.9)
MONOCYTES NFR BLD AUTO: 9 %
NEUTROPHILS # BLD AUTO: 2.8 X10E3/UL (ref 1.4–7)
NEUTROPHILS NFR BLD AUTO: 43 %
PLATELET # BLD AUTO: 205 X10E3/UL (ref 150–450)
POTASSIUM SERPL-SCNC: 3.8 MMOL/L (ref 3.5–5.2)
PROT SERPL-MCNC: 5.8 G/DL (ref 6–8.5)
RBC # BLD AUTO: 4.25 X10E6/UL (ref 3.77–5.28)
SODIUM SERPL-SCNC: 138 MMOL/L (ref 134–144)
TTG IGA SER-ACNC: <2 U/ML (ref 0–3)
TTG IGG SER-ACNC: <2 U/ML (ref 0–5)
WBC # BLD AUTO: 6.6 X10E3/UL (ref 3.4–10.8)

## 2023-11-24 DIAGNOSIS — R10.10 UPPER ABDOMINAL PAIN: ICD-10-CM

## 2023-11-24 RX ORDER — FAMOTIDINE 40 MG/1
40 TABLET, FILM COATED ORAL DAILY
Qty: 90 TABLET | Refills: 0 | Status: SHIPPED | OUTPATIENT
Start: 2023-11-24

## 2024-02-04 DIAGNOSIS — E06.3 HASHIMOTO'S THYROIDITIS: ICD-10-CM

## 2024-02-05 RX ORDER — LEVOTHYROXINE SODIUM 125 MCG
TABLET ORAL
Qty: 72 TABLET | Refills: 3 | Status: SHIPPED | OUTPATIENT
Start: 2024-02-05

## 2024-04-16 LAB
25(OH)D3+25(OH)D2 SERPL-MCNC: 28.1 NG/ML (ref 30–100)
ALBUMIN SERPL-MCNC: 4.2 G/DL (ref 3.9–4.9)
ALBUMIN/GLOB SERPL: 2.2 {RATIO} (ref 1.2–2.2)
ALP SERPL-CCNC: 35 IU/L (ref 44–121)
ALT SERPL-CCNC: 26 IU/L (ref 0–32)
AST SERPL-CCNC: 19 IU/L (ref 0–40)
BASOPHILS # BLD AUTO: 0.1 X10E3/UL (ref 0–0.2)
BASOPHILS NFR BLD AUTO: 1 %
BILIRUB SERPL-MCNC: 0.6 MG/DL (ref 0–1.2)
BUN SERPL-MCNC: 13 MG/DL (ref 6–24)
BUN/CREAT SERPL: 17 (ref 9–23)
CALCIUM SERPL-MCNC: 9 MG/DL (ref 8.7–10.2)
CHLORIDE SERPL-SCNC: 104 MMOL/L (ref 96–106)
CHOLEST SERPL-MCNC: 188 MG/DL (ref 100–199)
CHOLEST/HDLC SERPL: 2.7 RATIO (ref 0–4.4)
CO2 SERPL-SCNC: 21 MMOL/L (ref 20–29)
CREAT SERPL-MCNC: 0.77 MG/DL (ref 0.57–1)
EGFR: 96 ML/MIN/1.73
EOSINOPHIL # BLD AUTO: 0.2 X10E3/UL (ref 0–0.4)
EOSINOPHIL NFR BLD AUTO: 2 %
ERYTHROCYTE [DISTWIDTH] IN BLOOD BY AUTOMATED COUNT: 13 % (ref 11.7–15.4)
GLOBULIN SER-MCNC: 1.9 G/DL (ref 1.5–4.5)
GLUCOSE SERPL-MCNC: 94 MG/DL (ref 70–99)
HCT VFR BLD AUTO: 41.8 % (ref 34–46.6)
HDLC SERPL-MCNC: 70 MG/DL
HGB BLD-MCNC: 13.2 G/DL (ref 11.1–15.9)
IMM GRANULOCYTES # BLD: 0 X10E3/UL (ref 0–0.1)
IMM GRANULOCYTES NFR BLD: 0 %
LDLC SERPL CALC-MCNC: 104 MG/DL (ref 0–99)
LYMPHOCYTES # BLD AUTO: 2.1 X10E3/UL (ref 0.7–3.1)
LYMPHOCYTES NFR BLD AUTO: 32 %
MCH RBC QN AUTO: 30 PG (ref 26.6–33)
MCHC RBC AUTO-ENTMCNC: 31.6 G/DL (ref 31.5–35.7)
MCV RBC AUTO: 95 FL (ref 79–97)
MONOCYTES # BLD AUTO: 0.5 X10E3/UL (ref 0.1–0.9)
MONOCYTES NFR BLD AUTO: 8 %
NEUTROPHILS # BLD AUTO: 3.7 X10E3/UL (ref 1.4–7)
NEUTROPHILS NFR BLD AUTO: 57 %
PLATELET # BLD AUTO: 197 X10E3/UL (ref 150–450)
POTASSIUM SERPL-SCNC: 3.6 MMOL/L (ref 3.5–5.2)
PROT SERPL-MCNC: 6.1 G/DL (ref 6–8.5)
RBC # BLD AUTO: 4.4 X10E6/UL (ref 3.77–5.28)
SL AMB VLDL CHOLESTEROL CALC: 14 MG/DL (ref 5–40)
SODIUM SERPL-SCNC: 136 MMOL/L (ref 134–144)
T4 FREE SERPL-MCNC: 1.51 NG/DL (ref 0.82–1.77)
TRIGL SERPL-MCNC: 76 MG/DL (ref 0–149)
TSH SERPL DL<=0.005 MIU/L-ACNC: 2.25 UIU/ML (ref 0.45–4.5)
WBC # BLD AUTO: 6.6 X10E3/UL (ref 3.4–10.8)

## 2024-04-21 DIAGNOSIS — E27.1 ADDISON'S DISEASE (HCC): ICD-10-CM

## 2024-04-23 ENCOUNTER — OFFICE VISIT (OUTPATIENT)
Dept: ENDOCRINOLOGY | Facility: HOSPITAL | Age: 49
End: 2024-04-23
Payer: COMMERCIAL

## 2024-04-23 VITALS
HEIGHT: 61 IN | DIASTOLIC BLOOD PRESSURE: 80 MMHG | SYSTOLIC BLOOD PRESSURE: 130 MMHG | WEIGHT: 160.2 LBS | HEART RATE: 84 BPM | BODY MASS INDEX: 30.25 KG/M2 | OXYGEN SATURATION: 99 %

## 2024-04-23 DIAGNOSIS — E06.3 HYPOTHYROIDISM DUE TO HASHIMOTO'S THYROIDITIS: Primary | ICD-10-CM

## 2024-04-23 DIAGNOSIS — E55.9 VITAMIN D DEFICIENCY: ICD-10-CM

## 2024-04-23 DIAGNOSIS — E27.1 ADDISON'S DISEASE (HCC): ICD-10-CM

## 2024-04-23 DIAGNOSIS — E03.8 HYPOTHYROIDISM DUE TO HASHIMOTO'S THYROIDITIS: Primary | ICD-10-CM

## 2024-04-23 PROCEDURE — 99215 OFFICE O/P EST HI 40 MIN: CPT | Performed by: STUDENT IN AN ORGANIZED HEALTH CARE EDUCATION/TRAINING PROGRAM

## 2024-04-23 RX ORDER — HYDROCORTISONE 10 MG/1
TABLET ORAL
Qty: 120 TABLET | Refills: 1 | Status: SHIPPED | OUTPATIENT
Start: 2024-04-23

## 2024-04-23 NOTE — PATIENT INSTRUCTIONS
"Usha pimentel     Please refer to the following instructions for patients with adrenal insufficiency:    1. Please get an alert bracelet that says \"Adrenal insufficiency. Give stress doses of steroids in case of illness.\"     2. If you become nauseous and are unable to keep hydrocortisone down, you need to go to an emergency room to get this medication via IV.     3. If you are ill with a low-grade fever of  F, please double your dose of hydrocortisone. If you have a fever of >100 F, please triple your hydrocortisone dose for 3 days or until fever resolves.     4. If you are undergoing a planed medical procedure (such as minor surgery, colonoscopy) or a major dental procedure (tooth extraction, root canal etc) you should double your dose the day before and the day of and the day after the procedure.    5. If a major surgery requiring general anesthesia is being planned, please notify your endocrinologist so that we can give instructions to the anesthesia team that will be taking care of you with regards to the amount of hydrocortisone to be given during surgery.    "

## 2024-04-23 NOTE — PROGRESS NOTES
Established Patient Progress Note       Chief Complaint   Patient presents with    Hypothyroidism        History of Present Illness:     Sonja Corado is a 48 y.o. female with a history of addisons disease since 2013 currently on cortef 20/10mg and florinef 0.1mg daily. Hypothyroidism since age 27 on synthroid 125mcg M-sat, VitD def taking 1000IU inconsistently along with mild hyperlipidemia who presents today for follow up. Previously seen by [address], this is a transfer of care visit, all previous notes/labs reviewed for the visit.       [place]gle[place]'s disease- reports was diagnosed with this after the birth of her daughter 14 years ago. Reports has been on cortef 20/10mg for long time. Reports has baseline fatigue which isnt better or worse but denies any nausea, vomiting, low BG issues, weight loss, salt craving. Denies any surgery. Does report illness with sinus infection recently but didn't take higher dose steroids then since was afraid of over doing it.     Hypothyroidism- patient on synthroid 125mcg M-sat daily, reports having symptoms of weight gain, constipation, dry skin, myalgia and fatigue. Overall feels not so well but isnt sure if this was dorita menopause or not. Most recent TFT 04/24 TSH 2.2 with T4 1.51.     VitD def- previously on 3000IU daily and currently taking 1000IU daily- sometimes inconsistent. Her recent levels were 28.     Hyperlipidemia- LDL improved to 104.     Patient Active Problem List   Diagnosis    Inglewood's disease (HCC)    Hypothyroidism    Vitamin D deficiency    BMI 28.0-28.9,adult      Past Medical History:   Diagnosis Date    [place]glewood disease (HCC)     Anxiety     Disease of thyroid gland     History of screening mammography 10/05/2020    Bi-Rads 1.    History of screening mammography 12/16/2021    Bi-Rads 1.      Past Surgical History:   Procedure Laterality Date    MAMMO (HISTORICAL)  2020    TONSILLECTOMY  2003      Family History   Problem Relation Age of Onset    Heart  "disease Mother     Coronary artery disease Mother         ablation    Hypothyroidism Mother     Osteoporosis Mother     Coronary artery disease Father         ETOH induced cardiomyopathy, defibrillator    Atrial fibrillation Father     No Known Problems Son     No Known Problems Daughter     No Known Problems Daughter     Breast cancer Neg Hx     Uterine cancer Neg Hx     Ovarian cancer Neg Hx      Social History     Tobacco Use    Smoking status: Never    Smokeless tobacco: Never   Substance Use Topics    Alcohol use: Not Currently     Allergies   Allergen Reactions    Amoxicillin Other (See Comments)       Current Outpatient Medications:     Ascorbic Acid (VITAMIN C) 100 MG tablet, Take by mouth, Disp: , Rfl:     Cholecalciferol (VITAMIN D3) 1000 units CAPS, Take by mouth, Disp: , Rfl:     cyanocobalamin (VITAMIN B-12) 100 MCG tablet, Take 100 mcg by mouth daily, Disp: , Rfl:     famotidine (PEPCID) 40 MG tablet, TAKE 1 TABLET BY MOUTH EVERY DAY, Disp: 90 tablet, Rfl: 0    fludrocortisone (FLORINEF) 0.1 mg tablet, TAKE 1 TABLET BY MOUTH EVERY DAY, Disp: 90 tablet, Rfl: 3    hydrocortisone (CORTEF) 10 mg tablet, TAKE 2 TABLETS IN THE MORNING AND 1 TABLET IN THE AFTERNOON PLUS EXTRA FOR ADRENAL EMERGENCY, Disp: 120 tablet, Rfl: 1    Magnesium 100 MG TABS, Take by mouth, Disp: , Rfl:     Multiple Vitamin (multivitamin) capsule, Take 1 capsule by mouth daily, Disp: , Rfl:     nystatin-triamcinolone (MYCOLOG-II) cream, Apply topically 2 (two) times a day, Disp: 30 g, Rfl: 0    Solu-CORTEF 100 MG SOLR, INJECT IN THE EVENT OF ADRENAL CRISIS, Disp: 2 mL, Rfl: 0    Synthroid 125 MCG tablet, TAKE 1 TABLET BY MOUTH MONDAY THROUGH SATURDAY, Disp: 72 tablet, Rfl: 3    Syringe/Needle, Disp, (SYRINGE 3CC/23GX1\") 23G X 1\" 3 ML MISC, by Does not apply route, Disp: , Rfl:     LORazepam (ATIVAN) 0.5 mg tablet, Take by mouth as needed   (Patient not taking: Reported on 4/23/2024), Disp: , Rfl:     Review of Systems   Constitutional:  " "Positive for fatigue and unexpected weight change.   HENT:  Negative for trouble swallowing and voice change.    Eyes:  Negative for photophobia and visual disturbance.   Respiratory:  Negative for choking and shortness of breath.    Gastrointestinal:  Positive for constipation. Negative for diarrhea.   Endocrine: Positive for cold intolerance. Negative for heat intolerance.   Musculoskeletal:  Positive for myalgias. Negative for arthralgias.   Skin:  Negative for rash.       Physical Exam:  Body mass index is 30.27 kg/m².  /80   Pulse 84   Ht 5' 1\" (1.549 m)   Wt 72.7 kg (160 lb 3.2 oz)   SpO2 99%   BMI 30.27 kg/m²    Wt Readings from Last 3 Encounters:   04/23/24 72.7 kg (160 lb 3.2 oz)   10/30/23 70.2 kg (154 lb 12.8 oz)   08/08/23 71.7 kg (158 lb)       Physical Exam  Constitutional:       Appearance: Normal appearance. She is obese.   Cardiovascular:      Rate and Rhythm: Normal rate and regular rhythm.      Pulses: Normal pulses.   Pulmonary:      Effort: Pulmonary effort is normal.   Abdominal:      General: Abdomen is flat. Bowel sounds are normal.      Palpations: Abdomen is soft.   Musculoskeletal:      Cervical back: Normal range of motion and neck supple.   Skin:     General: Skin is warm and dry.      Capillary Refill: Capillary refill takes less than 2 seconds.   Neurological:      General: No focal deficit present.      Mental Status: She is alert and oriented to person, place, and time.   Psychiatric:         Mood and Affect: Mood normal.         Labs:    Latest Reference Range & Units 04/15/24 07:57   Sodium 134 - 144 mmol/L 136   Potassium 3.5 - 5.2 mmol/L 3.6   Chloride 96 - 106 mmol/L 104   Carbon Dioxide 20 - 29 mmol/L 21   BUN 6 - 24 mg/dL 13   Creatinine 0.57 - 1.00 mg/dL 0.77   SL AMB BUN/CREATININE RATIO 9 - 23  17   GLUCOSE 70 - 99 mg/dL 94   AST 0 - 40 IU/L 19   ALT 0 - 32 IU/L 26   Total Protein 6.0 - 8.5 g/dL 6.1   Albumin 3.9 - 4.9 g/dL 4.2   Total Bilirubin 0.0 - 1.2 mg/dL " 0.6   GFR, Calculated >59 mL/min/1.73 96   Albumin/Globulin Ratio 1.2 - 2.2  2.2   Cholesterol 100 - 199 mg/dL 188   Triglycerides 0 - 149 mg/dL 76   HDL >39 mg/dL 70   LDL Calculated 0 - 99 mg/dL 104 (H)   VLDL Cholesterol Vargas 5 - 40 mg/dL 14   (H): Data is abnormally high     Latest Reference Range & Units 04/15/24 07:57   25-Hydroxy, Vitamin D 30.0 - 100.0 ng/mL 28.1 (L)   (L): Data is abnormally low       Latest Reference Range & Units 02/09/23 07:06 07/28/23 08:16 04/15/24 07:57   TSH, POC 0.450 - 4.500 uIU/mL 0.893 1.270 2.250   FREE T4 0.82 - 1.77 ng/dL 1.57 1.45 1.51       Impression & Plan:    Problem List Items Addressed This Visit          Endocrine    [place]'s disease (HCC)    Hypothyroidism - Primary       No orders of the defined types were placed in this encounter.      There are no Patient Instructions on file for this visit.  Patient is a 48yF with addisons disease, hypothyroidism, VitD def and hyperlipidemia who presents for follow up     1) Addisons- does not appear to be adrenally insufficient. Normal lytes, BP and no other signs of overt AI. Therefore continue with cortef 20mg in AM and 10mg in PM, c/w florinef 0.1mg daily. Reviewed sick day rules and doubling and tripling dose for illness. Repeat labs in 6 months     2) Hypothyroidism- although patient has several symptoms which may indicate low metabolism, do believe these maybe d/t dorita-menopause rather than thyroid since TSH and T4 are both normal. Discussed given still normal cycle, unable to check hormones since they will fluctuate. Recommend trying OTC rodiola, ashwagandha, black cohosh to see if these help. Also recommended seeing menopause specialist if interested. For now c/w synthroid 125mcg M-sat and repeat labs in 6 months     3) VitD def- take 3000IU daily and repeat level in 6 months     4) Hyperlipidemia- LDL improved     Discussed with the patient and all questioned fully answered. She will call me if any problems  arise.    Follow-up appointment in  6 months.     Counseled patient on diagnostic results, prognosis, risk and benefit of treatment options, instruction for management, importance of treatment compliance, Risk  factor reduction and impressions      Cassidy Real MD

## 2024-04-23 NOTE — TELEPHONE ENCOUNTER
Requested medication(s) are due for refill today: Yes  Patient has already received a courtesy refill: No  Other reason request has been forwarded to provider: Guidelines failed. Patient has a follow up with you today.

## 2024-06-10 DIAGNOSIS — E27.1 ADRENAL INSUFFICIENCY (ADDISON'S DISEASE) (HCC): ICD-10-CM

## 2024-06-10 RX ORDER — FLUDROCORTISONE ACETATE 0.1 MG/1
TABLET ORAL
Qty: 90 TABLET | Refills: 3 | Status: SHIPPED | OUTPATIENT
Start: 2024-06-10

## 2024-06-12 ENCOUNTER — ANNUAL EXAM (OUTPATIENT)
Dept: OBGYN CLINIC | Facility: CLINIC | Age: 49
End: 2024-06-12
Payer: COMMERCIAL

## 2024-06-12 VITALS
WEIGHT: 162.8 LBS | BODY MASS INDEX: 30.73 KG/M2 | HEIGHT: 61 IN | SYSTOLIC BLOOD PRESSURE: 130 MMHG | DIASTOLIC BLOOD PRESSURE: 82 MMHG

## 2024-06-12 DIAGNOSIS — Z01.419 ENCNTR FOR GYN EXAM (GENERAL) (ROUTINE) W/O ABN FINDINGS: Primary | ICD-10-CM

## 2024-06-12 DIAGNOSIS — Z12.31 ENCOUNTER FOR SCREENING MAMMOGRAM FOR BREAST CANCER: ICD-10-CM

## 2024-06-12 PROCEDURE — 99396 PREV VISIT EST AGE 40-64: CPT | Performed by: OBSTETRICS & GYNECOLOGY

## 2024-06-12 NOTE — PROGRESS NOTES
Annual Wellness Visit  [facility] - [address], [address], [place] [address]    ASSESSMENT/PLAN: Sonja Corado is a 48 y.o.  who presents for annual gynecologic exam.    Encounter for routine gynecologic examination  - Routine well woman exam completed today.  - Cervical Cancer Screening: Current ASCCP Guidelines reviewed. Last Pap: 2022.  Next Pap Due: 5 yrs.  - STI screening offered including HIV testing: offered, pt declined  - Contraceptive counseling discussed.  Current contraception: vasectomy  - Breast Cancer Screening: Last Mammogram 2023  - The following were reviewed in today's visit: breast self exam and menopause    Additional problems addressed during this visit:  1. Encntr for gyn exam (general) (routine) w/o abn findings      -  Advised patient to keep menstrual calendar and inform Gyn if bleeding occurs <21 days or last >7 days.  Management with progesterone only options reviewed  2. Encounter for screening mammogram for breast cancer  -     Mammo screening bilateral w 3d & cad; Future      Next visit: 1 yr      CC:  Annual Gynecologic Examination    HPI: Sonja Corado is a 48 y.o.  who presents for annual gynecologic examination.  Patient presents for Gyn exam.          Gyn History  Patient's last menstrual period was 2024.     Last Pap: 2022 was normal    She  reports being sexually active and has had partner(s) who are male. She reports using the following method of birth control/protection: Male Sterilization.       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  2021: History of screening mammography      Comment:  Bi-Rads 1.     Past Surgical History:  2020: MAMMO (HISTORICAL)  2003: TONSILLECTOMY     Family History   Problem Relation Age of Onset    Heart disease Mother      "Coronary artery disease Mother         ablation    Hypothyroidism Mother     Osteoporosis Mother     Coronary artery disease Father         ETOH induced cardiomyopathy, defibrillator    Atrial fibrillation Father     No Known Problems Son     No Known Problems Daughter     No Known Problems Daughter     Breast cancer Neg Hx     Uterine cancer Neg Hx     Ovarian cancer Neg Hx         Social History     Tobacco Use    Smoking status: Never    Smokeless tobacco: Never   Vaping Use    Vaping status: Never Used   Substance Use Topics    Alcohol use: Not Currently    Drug use: Never          Current Outpatient Medications:     Ascorbic Acid (VITAMIN C) 100 MG tablet, Take by mouth, Disp: , Rfl:     Cholecalciferol (VITAMIN D3) 1000 units CAPS, Take by mouth, Disp: , Rfl:     cyanocobalamin (VITAMIN B-12) 100 MCG tablet, Take 100 mcg by mouth daily, Disp: , Rfl:     fludrocortisone (FLORINEF) 0.1 mg tablet, TAKE 1 TABLET BY MOUTH EVERY DAY, Disp: 90 tablet, Rfl: 3    hydrocortisone (CORTEF) 10 mg tablet, TAKE 2 TABLETS IN THE MORNING AND 1 TABLET IN THE AFTERNOON PLUS EXTRA FOR ADRENAL EMERGENCY, Disp: 120 tablet, Rfl: 1    Magnesium 100 MG TABS, Take by mouth, Disp: , Rfl:     Multiple Vitamin (multivitamin) capsule, Take 1 capsule by mouth daily, Disp: , Rfl:     Solu-CORTEF 100 MG SOLR, INJECT IN THE EVENT OF ADRENAL CRISIS, Disp: 2 mL, Rfl: 0    Synthroid 125 MCG tablet, TAKE 1 TABLET BY MOUTH MONDAY THROUGH SATURDAY, Disp: 72 tablet, Rfl: 3    Syringe/Needle, Disp, (SYRINGE 3CC/23GX1\") 23G X 1\" 3 ML MISC, by Does not apply route, Disp: , Rfl:     famotidine (PEPCID) 40 MG tablet, TAKE 1 TABLET BY MOUTH EVERY DAY (Patient not taking: Reported on 6/12/2024), Disp: 90 tablet, Rfl: 0    LORazepam (ATIVAN) 0.5 mg tablet, Take by mouth as needed   (Patient not taking: Reported on 4/23/2024), Disp: , Rfl:     nystatin-triamcinolone (MYCOLOG-II) cream, Apply topically 2 (two) times a day (Patient not taking: Reported on " "6/12/2024), Disp: 30 g, Rfl: 0    She is allergic to amoxicillin..    ROS negative except as noted in HPI    Objective:  /82 (BP Location: Left arm, Patient Position: Sitting, Cuff Size: Standard)   Ht 5' 1\" (1.549 m)   Wt 73.8 kg (162 lb 12.8 oz)   LMP 06/03/2024   BMI 30.76 kg/m²      Physical Exam  Vitals and nursing note reviewed.   HENT:      Head: Normocephalic.   Chest:   Breasts:     Breasts are symmetrical.      Right: Normal. No bleeding, mass, nipple discharge, skin change or tenderness.      Left: Normal. No bleeding, mass, nipple discharge, skin change or tenderness.   Abdominal:      General: There is no distension.      Palpations: Abdomen is soft. There is no mass.      Tenderness: There is no abdominal tenderness. There is no rebound.   Genitourinary:     General: Normal vulva.      Exam position: Lithotomy position.      Labia:         Right: No rash, tenderness or lesion.         Left: No rash, tenderness or lesion.       Urethra: No urethral pain or urethral lesion.      Vagina: Normal. No vaginal discharge.      Cervix: No discharge, friability, lesion or erythema.      Uterus: Normal.       Adnexa: Right adnexa normal and left adnexa normal.      Rectum: No external hemorrhoid.   Musculoskeletal:      Right lower leg: No edema.      Left lower leg: No edema.   Lymphadenopathy:      Upper Body:      Right upper body: No axillary or pectoral adenopathy.      Left upper body: No axillary or pectoral adenopathy.   Skin:     General: Skin is warm.   Neurological:      Mental Status: She is alert and oriented to person, place, and time.   Psychiatric:         Mood and Affect: Mood normal.         Behavior: Behavior normal.         Thought Content: Thought content normal.         "

## 2024-06-18 DIAGNOSIS — E27.1 ADDISON'S DISEASE (HCC): ICD-10-CM

## 2024-06-18 RX ORDER — HYDROCORTISONE 10 MG/1
TABLET ORAL
Qty: 120 TABLET | Refills: 1 | Status: SHIPPED | OUTPATIENT
Start: 2024-06-18

## 2024-06-21 ENCOUNTER — HOSPITAL ENCOUNTER (OUTPATIENT)
Dept: MAMMOGRAPHY | Facility: IMAGING CENTER | Age: 49
Discharge: HOME | End: 2024-06-21
Payer: COMMERCIAL

## 2024-06-21 VITALS — BODY MASS INDEX: 30.21 KG/M2 | HEIGHT: 61 IN | WEIGHT: 160 LBS

## 2024-06-21 DIAGNOSIS — Z12.31 ENCOUNTER FOR SCREENING MAMMOGRAM FOR BREAST CANCER: ICD-10-CM

## 2024-06-21 PROCEDURE — 77063 BREAST TOMOSYNTHESIS BI: CPT

## 2024-06-21 PROCEDURE — 77067 SCR MAMMO BI INCL CAD: CPT

## 2024-08-12 DIAGNOSIS — E27.1 ADDISON'S DISEASE (HCC): ICD-10-CM

## 2024-08-12 RX ORDER — HYDROCORTISONE 10 MG/1
TABLET ORAL
Qty: 120 TABLET | Refills: 1 | Status: SHIPPED | OUTPATIENT
Start: 2024-08-12

## 2024-08-27 ENCOUNTER — TELEPHONE (OUTPATIENT)
Dept: FAMILY MEDICINE CLINIC | Facility: HOSPITAL | Age: 49
End: 2024-08-27

## 2025-01-10 DIAGNOSIS — E06.3 HASHIMOTO'S THYROIDITIS: ICD-10-CM

## 2025-01-10 RX ORDER — LEVOTHYROXINE SODIUM 125 MCG
TABLET ORAL
Qty: 72 TABLET | Refills: 1 | Status: SHIPPED | OUTPATIENT
Start: 2025-01-10

## 2025-02-15 LAB
25(OH)D3+25(OH)D2 SERPL-MCNC: 25.2 NG/ML (ref 30–100)
ALBUMIN SERPL-MCNC: 4 G/DL (ref 3.9–4.9)
ALP SERPL-CCNC: 35 IU/L (ref 44–121)
ALT SERPL-CCNC: 17 IU/L (ref 0–32)
AST SERPL-CCNC: 14 IU/L (ref 0–40)
BILIRUB SERPL-MCNC: 0.4 MG/DL (ref 0–1.2)
BUN SERPL-MCNC: 11 MG/DL (ref 6–24)
BUN/CREAT SERPL: 16 (ref 9–23)
CALCIUM SERPL-MCNC: 9.2 MG/DL (ref 8.7–10.2)
CHLORIDE SERPL-SCNC: 106 MMOL/L (ref 96–106)
CHOLEST SERPL-MCNC: 184 MG/DL (ref 100–199)
CHOLEST/HDLC SERPL: 2.9 RATIO (ref 0–4.4)
CO2 SERPL-SCNC: 22 MMOL/L (ref 20–29)
CREAT SERPL-MCNC: 0.69 MG/DL (ref 0.57–1)
EGFR: 108 ML/MIN/1.73
GLOBULIN SER-MCNC: 2.1 G/DL (ref 1.5–4.5)
GLUCOSE SERPL-MCNC: 86 MG/DL (ref 70–99)
HDLC SERPL-MCNC: 64 MG/DL
LDLC SERPL CALC-MCNC: 107 MG/DL (ref 0–99)
POTASSIUM SERPL-SCNC: 3.7 MMOL/L (ref 3.5–5.2)
PROT SERPL-MCNC: 6.1 G/DL (ref 6–8.5)
SL AMB VLDL CHOLESTEROL CALC: 13 MG/DL (ref 5–40)
SODIUM SERPL-SCNC: 142 MMOL/L (ref 134–144)
T4 FREE SERPL-MCNC: 1.34 NG/DL (ref 0.82–1.77)
TRIGL SERPL-MCNC: 68 MG/DL (ref 0–149)
TSH SERPL DL<=0.005 MIU/L-ACNC: 0.6 UIU/ML (ref 0.45–4.5)

## 2025-02-16 ENCOUNTER — RESULTS FOLLOW-UP (OUTPATIENT)
Dept: ENDOCRINOLOGY | Facility: HOSPITAL | Age: 50
End: 2025-02-16

## 2025-03-04 ENCOUNTER — TELEMEDICINE (OUTPATIENT)
Dept: ENDOCRINOLOGY | Facility: HOSPITAL | Age: 50
End: 2025-03-04
Payer: COMMERCIAL

## 2025-03-04 ENCOUNTER — TELEPHONE (OUTPATIENT)
Dept: ENDOCRINOLOGY | Facility: HOSPITAL | Age: 50
End: 2025-03-04

## 2025-03-04 DIAGNOSIS — E27.1 ADRENAL INSUFFICIENCY (ADDISON'S DISEASE) (HCC): ICD-10-CM

## 2025-03-04 DIAGNOSIS — E06.3 HASHIMOTO'S THYROIDITIS: Primary | ICD-10-CM

## 2025-03-04 DIAGNOSIS — E06.3 HYPOTHYROIDISM DUE TO HASHIMOTO'S THYROIDITIS: ICD-10-CM

## 2025-03-04 DIAGNOSIS — E27.1 ADDISON'S DISEASE (HCC): ICD-10-CM

## 2025-03-04 DIAGNOSIS — E55.9 VITAMIN D DEFICIENCY: ICD-10-CM

## 2025-03-04 PROCEDURE — 99214 OFFICE O/P EST MOD 30 MIN: CPT | Performed by: NURSE PRACTITIONER

## 2025-03-04 RX ORDER — HYDROCORTISONE SODIUM SUCCINATE 100 MG/2ML
INJECTION INTRAMUSCULAR; INTRAVENOUS
Qty: 2 ML | Refills: 0 | Status: SHIPPED | OUTPATIENT
Start: 2025-03-04

## 2025-03-04 RX ORDER — HYDROCORTISONE SODIUM SUCCINATE 100 MG/2ML
INJECTION INTRAMUSCULAR; INTRAVENOUS
Qty: 2 ML | Refills: 0 | OUTPATIENT
Start: 2025-03-04

## 2025-03-04 NOTE — PROGRESS NOTES
Virtual Regular VisitName: Sonja Corado      : 1975      MRN: [id number]  Encounter Provider: TRICIA Nickerson  Encounter Date: 3/4/2025   Encounter department: [facility]  :  Assessment & Plan  Hashimoto's thyroiditis         [place]'s disease (HCC)         Adrenal insufficiency (Wellesley's disease) (HCC)         Hypothyroidism due to Hashimoto's thyroiditis         Vitamin D deficiency             History of Present Illness     HPI  49 y.o. female with history of primary autoimmune hypothyroidism, Wellesley's disease, vitamin-D deficiency presents for follow-up. He was diagnosed with hypothyroidism over 14 years ago.  Approximately 10 years ago, she was evaluated for hypotension, nausea, vomiting, weight loss and discovered to have Wellesley's disease.  Currently, she treats with hydrocortisone 20 mg in the morning and 10 mg in the afternoon. She also treats with fludrocortisone 0.1 mg daily. She has not had any other recent illnesses and does not have any surgeries planned.  She complains of some fatigue at times. She does have a emergency bracelet indicating she has adrenal insufficiency and is on chronic steroid replacement. She does report normal menstrual cycles. She denies any history of celiac, B12 deficiency or diabetes.      She also has a history of hypothyroidism and takes Synthroid 125 mcg - 6 days per week.  Most recent TSH from 2025 is 0.602 with a free T4 of 1.34.      For her vitamin-D deficiency, she supplements with 4000 units of vitamin D3 daily. Recent level is low at 25.2.     Review of Systems   Constitutional:  Positive for fatigue. Negative for chills and fever.   HENT: Negative.  Negative for trouble swallowing and voice change.    Eyes: Negative.  Negative for photophobia, pain, discharge, redness, itching and visual disturbance.   Respiratory: Negative.  Negative for chest tightness and shortness of breath.     Cardiovascular: Negative.  Negative for chest pain.   Gastrointestinal: Negative.  Negative for abdominal pain, constipation, diarrhea and vomiting.   Endocrine: Negative for cold intolerance, heat intolerance, polydipsia, polyphagia and polyuria.   Genitourinary: Negative.    Musculoskeletal: Negative.    Skin: Negative.    Allergic/Immunologic: Negative.    Neurological: Negative.  Negative for dizziness, syncope, light-headedness and headaches.   Hematological: Negative.    Psychiatric/Behavioral: Negative.     All other systems reviewed and are negative.      Objective   There were no vitals taken for this visit.    Physical Exam    Physical Exam   Constitutional: She is oriented to person, place, and time. She appears well-developed and well-nourished. No distress.   HENT:   Head: Normocephalic and atraumatic.   Neck: Normal range of motion.   Pulmonary/Chest: Effort normal.   Musculoskeletal: Normal range of motion.   Neurological: She is alert and oriented to person, place, and time.   Skin: She is not diaphoretic.   Psychiatric: She has a normal mood and affect. Her behavior is normal.     Plan:  1.  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  Blood pressure is reasonable in the office today.  Continue Hydrocortisone at current doses morning and afternoon. Check comprehensive metabolic panel prior to next office visit.     2.  Hypothyroidism:  Her most recent TSH and free T4 are normal.  She will continue Synthroid 125 mcg Monday through Saturday with no dose on Sunday. Recheck TSH and free T4  prior to next visit.     3.  Vitamin-D deficiency:  Recent level is 25.2.  I have asked her to continue her dose of vitamin-D supplementation at 4000 units daily - consistently.  Will continue to monitor over time.     4.   Hyperlipidemia:  Stable.  Discussed the importance of a proper diet and exercise.  Will recheck prior to next visit to continue surveillance.    Administrative Statements   Encounter  provider TRICIA Nickerson    The Patient is located at Home and in the following state in which I hold an active license [place].    The patient was identified by name and date of birth. Sonja Corado was informed that this is a telemedicine visit and that the visit is being conducted through the Epic Embedded platform. She agrees to proceed..  My office door was closed. No one else was in the room.  She acknowledged consent and understanding of privacy and security of the video platform. The patient has agreed to participate and understands they can discontinue the visit at any time.    I have spent a total time of 30  minutes in caring for this patient on the day of the visit/encounter including Diagnostic results, Instructions for management, Importance of tx compliance, Impressions, Counseling / Coordination of care, Documenting in the medical record, Reviewing/placing orders in the medical record (including tests, medications, and/or procedures), and Obtaining or reviewing history  , not including the time spent for establishing the audio/video connection.

## 2025-03-04 NOTE — TELEPHONE ENCOUNTER
Reason for call:   [x] Prior Auth  [] Other:     Caller:  [] Patient  [x] Pharmacy  Pharmacy    [facility] #[id number] - [place] - [address] [place] [address]  Phone: [phone]  Fax: [phone]           Medication  hydrocortisone (Solu-CORTEF) 100 mg SOLR [[id number]]  hydrocortisone (Solu-CORTEF) 100 mg SOLR [[id number]]    Order Details  Dose, Route, Frequency: As Directed   Dispense Quantity: 2 mL Refills: 0          Sig: Inject in the event of adrenal crisis         Start Date: 03/04/25 End Date: --   Written Date: 03/04/25 Expiration Date: 03/04/26       Associated Diagnoses: 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 If failed, which medications did they fail?    [] No   [x] Yes -     Is the patient's insurance updated in EPIC?   [x] Yes   [] No     Is a copy of the patient's insurance scanned in EPIC?   [x] Yes   [] No

## 2025-03-05 NOTE — TELEPHONE ENCOUNTER
PA for hydrocortisone (Solu-CORTEF) 100 mg SUBMITTED to [employer]    via    [x][employer]-Case ID # [id number]  [x]PA sent as URGENT    All office notes, labs and other pertaining documents and studies sent. Clinical questions answered. Awaiting determination from insurance company.     Turnaround time for your insurance to make a decision on your Prior Authorization can take 7-21 business days.

## 2025-03-05 NOTE — TELEPHONE ENCOUNTER
Insurance called Rx refill line for the  for PA pm hydrocortisone - they will be faxing over documents with questions to be answered

## 2025-03-06 NOTE — TELEPHONE ENCOUNTER
Moises from the Prior Auth Dept called to request clinical information. He stated claim will auto-denied on 03/07/25 at 7:59 CST.     Case Ref # [id number]

## 2025-03-06 NOTE — TELEPHONE ENCOUNTER
Josseline from [facility] calling in to verify that above request for clinical documentation sent on 3/5 for PA was received. I did not see anything in media. Verified it may take 48 hours to receive a fax. I asked that Josseline please refax request and provided fax number-states request was re-sent.   Josseline- [facility] can be reached at: [phone] if needed. Thank you

## 2025-03-07 NOTE — TELEPHONE ENCOUNTER
Tabitha from [facility] dept called in requesting to speak with a provider or a nurse regarding clinical questions.     When I put the caller on hold, the call was disconnected.

## 2025-03-07 NOTE — TELEPHONE ENCOUNTER
Spoke with [facility] prior authorizations, they will refax the form needing completion directly to the office.

## 2025-03-07 NOTE — TELEPHONE ENCOUNTER
[facility] dept called asking for additional clinical information. Relayed the above message and they will wait for the fax.  No further action needed

## 2025-03-12 NOTE — TELEPHONE ENCOUNTER
PA for hydrocortisone (Solu-CORTEF) 100 mg  APPROVED     Date(s) approved 03/07/27-3/5/27    Case #[id number]    Patient advised by          []iLinkhart Message  []Phone call   []LMOM  [x]L/M to call office as no active Communication consent on file  []Unable to leave detailed message as VM not approved on Communication consent       Pharmacy advised by    [x]Fax  []Phone call  []Secure Chat    Specialty Pharmacy    []      Approval letter scanned into Media Yes

## 2025-03-12 NOTE — TELEPHONE ENCOUNTER
The pt called back as she received a message from Alyssa - she thanked us for letting her know that it was approved.

## 2025-03-22 DIAGNOSIS — E27.1 ADDISON'S DISEASE (HCC): ICD-10-CM

## 2025-03-25 RX ORDER — HYDROCORTISONE 10 MG/1
TABLET ORAL
Qty: 120 TABLET | Refills: 1 | Status: SHIPPED | OUTPATIENT
Start: 2025-03-25

## 2025-03-26 NOTE — TELEPHONE ENCOUNTER
[employer] message sent to patient requesting htat she make an appointment at her convenience.

## 2025-05-16 DIAGNOSIS — E27.1 ADDISON'S DISEASE (HCC): ICD-10-CM

## 2025-05-20 NOTE — TELEPHONE ENCOUNTER
Patient following up with refill request that remains pending from 5/16/25. Patient verbalizes pharmacy not having any refills remaining and being without. Please review Hydrocortisone refill request. Thank you.

## 2025-05-21 RX ORDER — HYDROCORTISONE 10 MG/1
TABLET ORAL
Qty: 120 TABLET | Refills: 1 | Status: SHIPPED | OUTPATIENT
Start: 2025-05-21

## 2025-06-01 DIAGNOSIS — E27.1 ADRENAL INSUFFICIENCY (ADDISON'S DISEASE) (HCC): ICD-10-CM

## 2025-06-03 RX ORDER — FLUDROCORTISONE ACETATE 0.1 MG/1
0.1 TABLET ORAL DAILY
Qty: 90 TABLET | Refills: 3 | Status: SHIPPED | OUTPATIENT
Start: 2025-06-03

## 2025-06-21 DIAGNOSIS — E06.3 HASHIMOTO'S THYROIDITIS: ICD-10-CM

## 2025-06-23 RX ORDER — LEVOTHYROXINE SODIUM 125 MCG
TABLET ORAL
Qty: 72 TABLET | Refills: 1 | Status: SHIPPED | OUTPATIENT
Start: 2025-06-23

## 2025-07-16 DIAGNOSIS — E27.1 ADDISON'S DISEASE (HCC): ICD-10-CM

## 2025-07-16 DIAGNOSIS — Z12.31 ENCOUNTER FOR SCREENING MAMMOGRAM FOR BREAST CANCER: Primary | ICD-10-CM

## 2025-07-16 RX ORDER — HYDROCORTISONE 10 MG/1
TABLET ORAL
Qty: 120 TABLET | Refills: 1 | Status: SHIPPED | OUTPATIENT
Start: 2025-07-16

## 2025-08-07 LAB
25(OH)D3+25(OH)D2 SERPL-MCNC: 35.6 NG/ML (ref 30–100)
ALBUMIN SERPL-MCNC: 4.5 G/DL (ref 3.9–4.9)
ALP SERPL-CCNC: 37 IU/L (ref 44–121)
ALT SERPL-CCNC: 16 IU/L (ref 0–32)
AST SERPL-CCNC: 16 IU/L (ref 0–40)
BASOPHILS # BLD AUTO: 0.1 X10E3/UL (ref 0–0.2)
BASOPHILS NFR BLD AUTO: 1 %
BILIRUB SERPL-MCNC: 0.6 MG/DL (ref 0–1.2)
BUN SERPL-MCNC: 13 MG/DL (ref 6–24)
BUN/CREAT SERPL: 15 (ref 9–23)
CALCIUM SERPL-MCNC: 9.1 MG/DL (ref 8.7–10.2)
CHLORIDE SERPL-SCNC: 102 MMOL/L (ref 96–106)
CHOLEST SERPL-MCNC: 188 MG/DL (ref 100–199)
CHOLEST/HDLC SERPL: 2.6 RATIO (ref 0–4.4)
CO2 SERPL-SCNC: 21 MMOL/L (ref 20–29)
CREAT SERPL-MCNC: 0.84 MG/DL (ref 0.57–1)
EGFR: 86 ML/MIN/1.73
EOSINOPHIL # BLD AUTO: 0.1 X10E3/UL (ref 0–0.4)
EOSINOPHIL NFR BLD AUTO: 2 %
ERYTHROCYTE [DISTWIDTH] IN BLOOD BY AUTOMATED COUNT: 12.5 % (ref 11.7–15.4)
GLOBULIN SER-MCNC: 1.8 G/DL (ref 1.5–4.5)
GLUCOSE SERPL-MCNC: 82 MG/DL (ref 70–99)
HCT VFR BLD AUTO: 42.4 % (ref 34–46.6)
HDLC SERPL-MCNC: 72 MG/DL
HGB BLD-MCNC: 13.8 G/DL (ref 11.1–15.9)
IMM GRANULOCYTES # BLD: 0 X10E3/UL (ref 0–0.1)
IMM GRANULOCYTES NFR BLD: 0 %
LDLC SERPL CALC-MCNC: 105 MG/DL (ref 0–99)
LYMPHOCYTES # BLD AUTO: 3.2 X10E3/UL (ref 0.7–3.1)
LYMPHOCYTES NFR BLD AUTO: 45 %
MCH RBC QN AUTO: 30.9 PG (ref 26.6–33)
MCHC RBC AUTO-ENTMCNC: 32.5 G/DL (ref 31.5–35.7)
MCV RBC AUTO: 95 FL (ref 79–97)
MONOCYTES # BLD AUTO: 0.7 X10E3/UL (ref 0.1–0.9)
MONOCYTES NFR BLD AUTO: 10 %
NEUTROPHILS # BLD AUTO: 3 X10E3/UL (ref 1.4–7)
NEUTROPHILS NFR BLD AUTO: 42 %
PLATELET # BLD AUTO: 248 X10E3/UL (ref 150–450)
POTASSIUM SERPL-SCNC: 3.8 MMOL/L (ref 3.5–5.2)
PROT SERPL-MCNC: 6.3 G/DL (ref 6–8.5)
RBC # BLD AUTO: 4.47 X10E6/UL (ref 3.77–5.28)
SL AMB VLDL CHOLESTEROL CALC: 11 MG/DL (ref 5–40)
SODIUM SERPL-SCNC: 137 MMOL/L (ref 134–144)
T4 FREE SERPL-MCNC: 1.68 NG/DL (ref 0.82–1.77)
TRIGL SERPL-MCNC: 59 MG/DL (ref 0–149)
TSH SERPL DL<=0.005 MIU/L-ACNC: 0.72 UIU/ML (ref 0.45–4.5)
WBC # BLD AUTO: 7.1 X10E3/UL (ref 3.4–10.8)

## 2025-08-11 ENCOUNTER — ANNUAL EXAM (OUTPATIENT)
Dept: OBGYN CLINIC | Facility: CLINIC | Age: 50
End: 2025-08-11
Payer: COMMERCIAL

## 2025-08-21 ENCOUNTER — NURSE TRIAGE (OUTPATIENT)
Age: 50
End: 2025-08-21